# Patient Record
Sex: FEMALE | Race: BLACK OR AFRICAN AMERICAN | Employment: FULL TIME | URBAN - METROPOLITAN AREA
[De-identification: names, ages, dates, MRNs, and addresses within clinical notes are randomized per-mention and may not be internally consistent; named-entity substitution may affect disease eponyms.]

---

## 2017-02-15 ENCOUNTER — HOSPITAL ENCOUNTER (OUTPATIENT)
Age: 32
Discharge: HOME | End: 2017-02-15
Attending: NURSE PRACTITIONER
Payer: COMMERCIAL

## 2017-02-15 DIAGNOSIS — N97.9 FEMALE INFERTILITY: ICD-10-CM

## 2017-02-15 DIAGNOSIS — E22.1 HYPERPROLACTINEMIA (HCC): ICD-10-CM

## 2017-02-15 PROCEDURE — 74011250636 HC RX REV CODE- 250/636: Performed by: NURSE PRACTITIONER

## 2017-02-15 PROCEDURE — A9585 GADOBUTROL INJECTION: HCPCS | Performed by: NURSE PRACTITIONER

## 2017-02-15 PROCEDURE — 70553 MRI BRAIN STEM W/O & W/DYE: CPT

## 2017-02-15 RX ADMIN — GADOBUTROL 10 ML: 604.72 INJECTION INTRAVENOUS at 17:00

## 2018-02-27 PROBLEM — Z37.9 NORMAL LABOR: Status: ACTIVE | Noted: 2018-02-27

## 2018-10-09 ENCOUNTER — TELEPHONE (OUTPATIENT)
Dept: SURGERY | Age: 33
End: 2018-10-09

## 2018-10-09 NOTE — TELEPHONE ENCOUNTER
Contacted patient and verified identity using name and date of birth (2- identifiers). Called to see if pt received e-mail to watch video about surgical procedures. Pt stated she received e-mail and has already watched the video.

## 2018-10-12 ENCOUNTER — OFFICE VISIT (OUTPATIENT)
Dept: SURGERY | Age: 33
End: 2018-10-12

## 2018-10-12 VITALS
TEMPERATURE: 98.5 F | HEART RATE: 64 BPM | SYSTOLIC BLOOD PRESSURE: 138 MMHG | DIASTOLIC BLOOD PRESSURE: 88 MMHG | BODY MASS INDEX: 45.99 KG/M2 | HEIGHT: 67 IN | WEIGHT: 293 LBS | RESPIRATION RATE: 16 BRPM

## 2018-10-12 DIAGNOSIS — E66.01 MORBID OBESITY (HCC): Primary | ICD-10-CM

## 2018-10-12 DIAGNOSIS — E55.9 HYPOVITAMINOSIS D: ICD-10-CM

## 2018-10-12 RX ORDER — PHENTERMINE HYDROCHLORIDE 15 MG/1
15 CAPSULE ORAL
COMMUNITY
End: 2019-05-06

## 2018-10-12 NOTE — PROGRESS NOTES
Initial Consultation for Bariatric Surgery Template (Gastric Bypass)    Faviola Hong is a 35 y.o. female who comes into the office today for initial consultation for the surgical options for the treatment of morbid obesity. The patient initially identified obesity at the age of 12 and at age 25 weighed 210lbs. She has tried a variety of unsupervised weight-loss attempts including self imposed and Weight Watchers, but has yet to meet with lasting success. Maximum weight lost on a diet is about 60 lbs, but that the weight loss always seems to return. Today, the patient is  Height: 5' 7\" (170.2 cm) tall, Weight: 139.3 kg (307 lb) lbs for a Body mass index is 48.08 kg/(m^2). It is due to the patient's severe obesity, which is further complicated by hypovit D  that the patient is now seeking out bariatric surgery, specifically, gastric bypass. Past Medical History:   Diagnosis Date    Asthma     Hyperprolactinemia ([facility] [address]     Hypothyroidism        No past surgical history on file. Current Outpatient Prescriptions   Medication Sig Dispense Refill    phentermine (ADIPEX_P) 15 mg capsule Take 15 mg by mouth every morning.  CHOLECALCIFEROL, VITAMIN D3, (VITAMIN D3 PO) Take  by mouth.  levothyroxine (SYNTHROID) 75 mcg tablet Take 75 mcg by mouth Daily (before breakfast).  PNV RE.95/YPEVDHD FUM/FOLIC AC (PRENATAL PO) Take  by mouth.          No Known Allergies    Social History   Substance Use Topics    Smoking status: Never Smoker    Smokeless tobacco: Never Used    Alcohol use Yes      Comment: occ       Family History   Problem Relation Age of Onset    Hypertension Mother     Sleep Apnea Mother     Hypertension Father        Family Status   Relation Status    Mother [de-identified]    Father Alive       Review of Systems:  Positive in BOLD    CONST: Fever, weight loss, fatigue or chills  GI: Nausea, vomiting, abdominal pain, change in bowel habits, hematochezia, melena, and GERD   INTEG: Dermatitis, abnormal moles  HEENT: Recent changes in vision, vertigo, epistaxis, dysphagia and hoarseness  CV: Chest pain, palpitations, HTN, edema and varicosities  RESP: Cough, shortness of breath, wheezing, hemoptysis, snoring and reactive airway disease  : Hematuria, dysuria, frequency, urgency, nocturia and stress urinary incontinence   MS: Weakness, joint pain - knees and arthritis  ENDO: Diabetes, thyroid disease, polyuria, polydipsia, polyphagia, poor wound healing, heat intolerance, cold intolerance  LYMPH/HEME: Anemia, bruising and history of blood transfusions  NEURO: Dizziness, headache, fainting, seizures and stroke  PSYCH: Anxiety and depression      Physical Exam    Visit Vitals    /88 (BP 1 Location: Right arm, BP Patient Position: Sitting)    Pulse 64    Temp 98.5 °F (36.9 °C) (Oral)    Resp 16    Ht 5' 7\" (1.702 m)    Wt 139.3 kg (307 lb)    LMP 04/12/2018    BMI 48.08 kg/m2       Pre op weight: 307  EBW: 167  Wt loss to date: 0       General: 35 y.o.) female in no acute distress. Morbidly obese in hips, thighs, buttocks - gynecoid pattern  HEENT: Normocephalic, atraumatic, Pupils equal and reactive, nasopharynx clear, oropharynx clear and moist without lesions  NECK: Supple, no lymphadenopathy, thyromegaly, carotid bruits or jugular venous distension. trachea midline  RESP: Clear to auscultation bilaterally, no wheezes, rhonchi, or rales, normal respiratory excursion  CV: Regular rate and rhythm, no murmurs, rubs or gallops. 3+/4 pulses in bilateral dorsalis pedis and posterior tibialis. No distal edema or varicosities. ABD: Soft, nontender, nondistended, normoactive bowel sounds, no hernias, no hepatosplenomegaly, easily palpable costal margins, gynecoid distribution  Extremities: Warm, well perfused, no tenderness or swelling, normal gait/station  Neuro: Sensation and strength grossly intact and symmetrical  Psych: Alert and oriented to person, place, and time.     Impression:    Jevon Ammons Pearl Luis is a 35 y.o. female who is suffering from morbid obesity with a BMI of 48  and comorbidities including no significant previous medical problems  who would benefit from bariatric surgery. We have had an extensive discussion with regard to the risks, benefits and likely outcomes of the operation. We've discussed the restrictive and malabsorptive nature of the gastric bypass and compared and contrasted with the sleeve gastrectomy. The patient understands the likelihood of losing approximately 80% of their excess weight in 12 to 18 months. She also understands the risks including but not limited to bleeding, infection, need for reoperation, ulcers, leaks and strictures, bowel obstruction secondary to adhesions and internal hernias, DVT, PE, heart attack, stroke, and death. Patient also understands risks of inadequate weight loss, excess weight loss, vitamin insufficiency, protein malnutrition, excess skin, and loss of hair. We have reviewed the components of a successful postoperative course including requirement for a high protein, low carbohydrate diet, 60 oz a day of zero calorie liquids, daily vitamin supplementation, daily exercise, regular follow-up, and participation in support groups. At this time we will enroll the patient in our bariatric program, undertake routine laboratory evaluation, chest X-ray, EKG, possible UGI and evaluation by  nutritionist as well as psychologist and pending their satisfactory completion of the preop evaluation, plan to perform a gastric bypass.

## 2018-10-12 NOTE — PROGRESS NOTES
Daquan Lim is a 35 y.o. female Who presents today seeking education on weight loss options. Body mass index is 48.08 kg/(m^2). 1. Have you been to the ER, urgent care clinic since your last visit? Hospitalized since your last visit? No    2. Have you seen or consulted any other health care providers outside of the [address] [place] [address] since your last visit? Include any pap smears or colon screening.  No

## 2018-10-16 LAB — UREA BREATH TEST QL: NEGATIVE

## 2018-10-25 ENCOUNTER — HOSPITAL ENCOUNTER (OUTPATIENT)
Dept: BARIATRICS/WEIGHT MGMT | Age: 33
Discharge: HOME | End: 2018-10-25

## 2018-10-26 ENCOUNTER — DOCUMENTATION ONLY (OUTPATIENT)
Dept: BARIATRICS/WEIGHT MGMT | Age: 33
End: 2018-10-26

## 2018-10-26 NOTE — PROGRESS NOTES
[address] [place] [address] [facility], [address]    Patient's Name: Isaac Zamora   Age: 35 y.o. YOB: 1985   Sex: female    Date:   10/26/2018    Insurance:            Session: 1 of 6  Revision:   Surgeon:  Dr. Carl Underwood    Height: 5 f 7 Weight:    305      Lbs. BMI: 47.9   Pounds Lost since last month: 2               Pounds Gained since last month: 0    Starting Weight: 307   Previous Months Weight: 307  Overall Pounds Lost: 2 Overall Pounds Gained: 0      Do you smoke? None    Alcohol intake:  Number of drinks at a time:  2  Number of times a week: 1 x a week    Class Guidelines    Guidelines are reviewed with patient at the start of every class. 1. Patient understands that weight loss trial classes must be consecutive. Patient understands if they miss a class, it is their responsibility to contact me to reschedule class. I will reach out to patient after their first no show. 2.  Patient understands the expectations that weight maintenance/weight loss is expected during the classes. Failure to demonstrate changes may result in one extra month of weight loss trial, followed by going back to see the surgeon. 3. Patient is also instructed to be doing their labs, blood work, psych visit, support group and any other test that the surgeon has used while they are working on their weight loss trial.  4. Patient is instructed to bring their education binder to all classes. Other Pertinent Information:     Changes Made Since Last Class: Lower her carbohydrate intake    Eating Habits and Behaviors      Today we reviewed key diet principles. We talked about patient following a low calorie/low carbohydrate diet while they are in weight loss trials. To achieve this, patient is encouraged to avoid liquid calories, including alcohol, soda, sweet tea, and fruit juices.    Patient can cut carbohydrates by trying to stick to meat and vegetables. Patient can also eat eggs, cheese, and good fat, while trying to eliminate starches, such as pasta, rice, crackers, chips, popcorn. I also gave a power point that included 21 Ways to Stay on Track with a Healthy Lifestyle. Some of the food-related suggestions included drinking plenty of water or calorie-free beverages prior to their meal.  Patient is encouraged to to fill up on protein first, which is the ultimate fill-me up food. We talked about the importance of eating breakfast and the effects that can happen if one skips meals, which includes eating larger portions, snacking more, and decreasing their metabolism. With the suggestions in the power point, patient will be able to decrease their calories and carbohydrate intake. Patient's current diet habits include: 3 meals per day. Patient is doing a slice of quiche for breakfast.  Lunch is a piece of protein, veggies, and very little carbohydrates. Patient is doing leftovers for dinner but states she is doing very little. She states she is trying to be more mindful of her snack choices and eating less carbohydrates. She is drinking 64 ounces of water per day. Physical Activity/Exercise    Comments: We talked about the importance of establishing a work out routine. Patient is currently doing walking for 30 minutes daily for activity. Behavior Modification       Comments:   Some of the behavior tips that were included in the power point, include being choosy about night time snacking. Patient was encouraged to make the TV a no eating zone and not eat after 7 pm.  Patient is also encouraged to keep a food journal.        One of the other things we talked about during class is whether or not patient has a support system. Patient indicated that their family is supportive of the surgery and they have some that will be there for them after surgery.   I also reminded all patients to make their psychologist appointment and attend at least 1 support group.       Sonnie Mortimer, Luite Norberto [address] RD  10/26/2018

## 2018-10-27 ENCOUNTER — HOSPITAL ENCOUNTER (OUTPATIENT)
Dept: LAB | Age: 33
Discharge: HOME | End: 2018-10-27
Payer: COMMERCIAL

## 2018-10-27 DIAGNOSIS — E55.9 HYPOVITAMINOSIS D: ICD-10-CM

## 2018-10-27 DIAGNOSIS — E66.01 MORBID OBESITY (HCC): ICD-10-CM

## 2018-10-27 LAB
25(OH)D3 SERPL-MCNC: 19.9 NG/ML (ref 30–100)
ALBUMIN SERPL-MCNC: 3.8 G/DL (ref 3.4–5)
ANION GAP SERPL CALC-SCNC: 6 MMOL/L (ref 3–18)
BASOPHILS # BLD: 0 K/UL (ref 0–0.1)
BASOPHILS NFR BLD: 0 % (ref 0–2)
BUN SERPL-MCNC: 13 MG/DL (ref 7–18)
BUN/CREAT SERPL: 13 (ref 12–20)
CALCIUM SERPL-MCNC: 8.9 MG/DL (ref 8.5–10.1)
CHLORIDE SERPL-SCNC: 107 MMOL/L (ref 100–108)
CO2 SERPL-SCNC: 27 MMOL/L (ref 21–32)
CREAT SERPL-MCNC: 1.02 MG/DL (ref 0.6–1.3)
DIFFERENTIAL METHOD BLD: ABNORMAL
EOSINOPHIL # BLD: 0.1 K/UL (ref 0–0.4)
EOSINOPHIL NFR BLD: 1 % (ref 0–5)
ERYTHROCYTE [DISTWIDTH] IN BLOOD BY AUTOMATED COUNT: 13.5 % (ref 11.6–14.5)
EST. AVERAGE GLUCOSE BLD GHB EST-MCNC: 111 MG/DL
FOLATE SERPL-MCNC: 18.6 NG/ML (ref 3.1–17.5)
GLUCOSE SERPL-MCNC: 74 MG/DL (ref 74–99)
HBA1C MFR BLD: 5.5 % (ref 4.2–5.6)
HCT VFR BLD AUTO: 41 % (ref 35–45)
HGB BLD-MCNC: 13.3 G/DL (ref 12–16)
IRON SERPL-MCNC: 58 UG/DL (ref 50–175)
LYMPHOCYTES # BLD: 2 K/UL (ref 0.9–3.6)
LYMPHOCYTES NFR BLD: 44 % (ref 21–52)
MCH RBC QN AUTO: 30.3 PG (ref 24–34)
MCHC RBC AUTO-ENTMCNC: 32.4 G/DL (ref 31–37)
MCV RBC AUTO: 93.4 FL (ref 74–97)
MONOCYTES # BLD: 0.6 K/UL (ref 0.05–1.2)
MONOCYTES NFR BLD: 12 % (ref 3–10)
NEUTS SEG # BLD: 2 K/UL (ref 1.8–8)
NEUTS SEG NFR BLD: 43 % (ref 40–73)
PLATELET # BLD AUTO: 270 K/UL (ref 135–420)
PMV BLD AUTO: 10.3 FL (ref 9.2–11.8)
POTASSIUM SERPL-SCNC: 4 MMOL/L (ref 3.5–5.5)
RBC # BLD AUTO: 4.39 M/UL (ref 4.2–5.3)
SODIUM SERPL-SCNC: 140 MMOL/L (ref 136–145)
TSH SERPL DL<=0.05 MIU/L-ACNC: 1.49 UIU/ML (ref 0.36–3.74)
VIT B12 SERPL-MCNC: 302 PG/ML (ref 211–911)
WBC # BLD AUTO: 4.7 K/UL (ref 4.6–13.2)

## 2018-10-27 PROCEDURE — 85025 COMPLETE CBC W/AUTO DIFF WBC: CPT | Performed by: SURGERY

## 2018-10-27 PROCEDURE — 83036 HEMOGLOBIN GLYCOSYLATED A1C: CPT | Performed by: SURGERY

## 2018-10-27 PROCEDURE — 84443 ASSAY THYROID STIM HORMONE: CPT | Performed by: SURGERY

## 2018-10-27 PROCEDURE — 82607 VITAMIN B-12: CPT | Performed by: SURGERY

## 2018-10-27 PROCEDURE — 82306 VITAMIN D 25 HYDROXY: CPT | Performed by: SURGERY

## 2018-10-27 PROCEDURE — 82040 ASSAY OF SERUM ALBUMIN: CPT | Performed by: SURGERY

## 2018-10-27 PROCEDURE — 36415 COLL VENOUS BLD VENIPUNCTURE: CPT | Performed by: SURGERY

## 2018-10-27 PROCEDURE — 80048 BASIC METABOLIC PNL TOTAL CA: CPT | Performed by: SURGERY

## 2018-10-27 PROCEDURE — 83540 ASSAY OF IRON: CPT | Performed by: SURGERY

## 2018-10-27 PROCEDURE — 84425 ASSAY OF VITAMIN B-1: CPT | Performed by: SURGERY

## 2018-11-01 LAB — VIT B1 BLD-SCNC: 87.8 NMOL/L (ref 66.5–200)

## 2018-11-01 NOTE — PROGRESS NOTES
11/1/18:  Patient states that her PCP recently prescribed 2000 IU of vitamin d3 per day. I have talked with patient that I would like her to start taking 5000 IU per day. Patient was also instructed to start MVI due to her folate levels being low.  
 
Alisia Nash MS RD

## 2018-11-29 ENCOUNTER — HOSPITAL ENCOUNTER (OUTPATIENT)
Dept: BARIATRICS/WEIGHT MGMT | Age: 33
Discharge: HOME | End: 2018-11-29

## 2018-11-30 ENCOUNTER — DOCUMENTATION ONLY (OUTPATIENT)
Dept: BARIATRICS/WEIGHT MGMT | Age: 33
End: 2018-11-30

## 2018-11-30 NOTE — PROGRESS NOTES
[address] [place] [address] [facility], [address]    Patient's Name: Constance Suero   Age: 35 y.o. YOB: 1985   Sex: female    Date:   11/30/2018    Insurance:            Session: 2 of 6  Revision:   Surgeon:  Dr. Manuel Enrique    Height: 5 f 7 Weight:    303      Lbs. BMI: 47.5   Pounds Lost since last month: 2               Pounds Gained since last month: 0    Starting Weight: 307   Previous Months Weight: 305  Overall Pounds Lost: 4 Overall Pounds Gained: 0      Do you smoke? None    Alcohol intake:  Number of drinks at a time:  1  Number of times a week: 1    Class Guidelines    Guidelines are reviewed with patient at the start of every class. 1. Patient understands that weight loss trial classes must be consecutive. Patient understands if they miss a class, it is their responsibility to contact me to reschedule class. I will reach out to patient after their first no show. 2.  Patient understands the expectations that weight maintenance/weight loss is expected during the classes. Failure to demonstrate changes may result in one extra month of weight loss trial, followed by going back to see the surgeon. 3. Patient is also instructed to be doing their labs, blood work, psych visit, support group and any other test that the surgeon has used while they are working on their weight loss trial.    Other Pertinent Information:     Changes Made Since Last Class: Drinking less wine and having less sweets. Dietary Instruction    During today's class we continued to focus on the key diet principles. Patient was instructed to follow a low carbohydrate diet, focusing on meat and vegetables. Patient was instructed to stop liquid calories and aim for 64 ounces of water per day. In class, I also gave patient a power point on surviving the holidays.   Some of the tips included survival tips for parties, including bringing their own low carbohydrate dish to a potluck dinner and surveying the buffet line before they start filling up their plate. Patient was given cooking alternatives, including using Splenda for sugar, substituting applesauce for oil in recipes, and using low fat plain yogurt instead of sour cream in dips. Patient was also encouraged to be mindful of calories in alcohol. Patient's diet habits include: 3 meals per day. Patient is eating eggs and cheese or oatmeal for breakfast.  Lunch is meat and vegetables. Dinner is meat and vegetables. She states she had cut out the snacking between meals. She is eating out 1-3 x a week, which may be chinese or kandi. Physical Activity/Exercise    Patient is currently not doing anything for activity. Today's power point on surviving the holidays also included tips on exercising. This included being creative during the holiday, walking stairs, mall walking, getting resistance bands. Patient was encouraged not to be afraid to excuse themselves from the table to go for a walk after they eat. Comments: She states she has a baby and it's difficult. We talked about increasing some walking. Behavior Modification    Reinforced behavior changes to make. Patient was encouraged to keep their emotions in check. Try to HALT and focus on whether they are eating out of hunger or if they are eating out of emotions. Other eating behaviors included surveying the buffet line before starting to fill up their plate. Patient was given a check off list and encouraged to monitor some of their eating behaviors, such as eating slowly, chewing their food thoroughly, and taking 20-30 minutes to eat a meal.    Goals that patient set for next month include:  1. Exercise more. 2. Less take out and more meal prep.   3. Drink more water      Nicholas Choudhary Norberto 87 RD  11/30/2018

## 2018-12-08 ENCOUNTER — HOSPITAL ENCOUNTER (OUTPATIENT)
Dept: LAB | Age: 33
Discharge: HOME | End: 2018-12-08
Payer: COMMERCIAL

## 2018-12-08 DIAGNOSIS — E66.01 MORBID OBESITY (HCC): ICD-10-CM

## 2018-12-08 DIAGNOSIS — E55.9 HYPOVITAMINOSIS D: ICD-10-CM

## 2018-12-08 LAB
ATRIAL RATE: 63 BPM
CALCULATED P AXIS, ECG09: 8 DEGREES
CALCULATED R AXIS, ECG10: 41 DEGREES
CALCULATED T AXIS, ECG11: 25 DEGREES
DIAGNOSIS, 93000: NORMAL
P-R INTERVAL, ECG05: 158 MS
Q-T INTERVAL, ECG07: 428 MS
QRS DURATION, ECG06: 84 MS
QTC CALCULATION (BEZET), ECG08: 437 MS
VENTRICULAR RATE, ECG03: 63 BPM

## 2018-12-08 PROCEDURE — 93005 ELECTROCARDIOGRAM TRACING: CPT

## 2018-12-27 ENCOUNTER — HOSPITAL ENCOUNTER (OUTPATIENT)
Dept: BARIATRICS/WEIGHT MGMT | Age: 33
Discharge: HOME | End: 2018-12-27

## 2018-12-28 ENCOUNTER — DOCUMENTATION ONLY (OUTPATIENT)
Dept: BARIATRICS/WEIGHT MGMT | Age: 33
End: 2018-12-28

## 2018-12-28 NOTE — PROGRESS NOTES
[address] [place] [address] [facility], [address]    Patient's Name: Alfonzo Triana   Age: 35 y.o. YOB: 1985   Sex: female    Date:   12/28/2018    Insurance:              Session: 3 of  6  Revision:     Surgeon:  Dr. Kwame Majano    Height: 5 f 7   Weight:    299      Lbs. BMI: 46.9   Pounds Lost since last month: 4                 Pounds Gained since last month: 0      Starting Weight: 307     Previous Months Weight: 303  Overall Pounds Lost: 8   Overall Pounds Gained: 0    Do you smoke? None    Alcohol intake:  Number of drinks at a time:  0-1  Number of times a week: 1    Class Guidelines    Guidelines are reviewed with patient at the start of every class. 1. Patient understands that weight loss trial classes must be consecutive. Patient understands if they miss a class, it is their responsibility to contact me to reschedule class. I will reach out to patient after their first no show. 2.  Patient understands the expectations that weight maintenance/weight loss is expected during the classes. Failure to demonstrate changes may result in one extra month of weight loss trial, followed by going back to see the surgeon. Patient understands that they CAN NOT gain any weight during the weight loss trial.  Gaining weight will result in extra classes. 3. Patient is also instructed to be doing their labs, blood work, psych visit, support group and any other test that the surgeon has used while they are working on their weight loss trial.  4.  Patient was instructed to bring their blue binder to every class and appointment. Other Pertinent Information:     Changes Made Since Last Class: Lost 2 more pounds, increased water intake, added more vegetables to diet. Eating Habits and Behaviors    Today in class, we reviewed the key diet principles. Specifically, patient was instructed to watch their carbohydrate intake.   We talked about foods that have carbohydrates in them and alternatives in place of this. Patient was encouraged to start cutting out bread, rice, pasta, and other starches. Patient is encouraged to work towards 64 ounces of fluid per day, avoiding soda, sweet tea, and fruit juices. I also gave a presentation at today's class on Eat Out Options. We looked at fast food traps and dining out strategies to stay in control. Patient was also given ideas from various restaurants that would be a healthier option. Patient's current diet habits include: 3 meals per day. Patient is doing eggs or oatmeal for breakfast.  Lunch is protein and vegetables. Dinner is protein and vegetables, on her good days. She states she is sometimes snacking on celery, carrots, or cookies. She states she is drinking very little between water and Crystal Light and is encouraged to keep pushing the fluid to get up to 64 ounces. Physical Activity/Exercise    Comments: We talked about exercise. Patient was given reasons of why exercise is so important and how that can help with their long-term success. I have encouraged patient to get a support system to help with the activity. Currently for activity, patient is doing [address],  [address] for 60 minutes 1 x a week. Behavior Modification       Comments:  I have also talked to patient about some behavior changes including taking 20-30 minutes to eat a meal.  Patient is encouraged to get a timer and use smaller utensils to help them stretch their meal out. Patient is also encouraged to get into a routine of not eating after 7 pm and make the TV a no eating zone. Goals that patient wants to work on includes:  1. Increase water intake more  2. Reduce caffeine intake   3. Get back to a keto diet.       Sonnie Mortimer, Luite Norberto [address] RD  12/28/2018

## 2019-01-24 ENCOUNTER — HOSPITAL ENCOUNTER (OUTPATIENT)
Dept: BARIATRICS/WEIGHT MGMT | Age: 34
Discharge: HOME | End: 2019-01-24

## 2019-01-25 ENCOUNTER — DOCUMENTATION ONLY (OUTPATIENT)
Dept: BARIATRICS/WEIGHT MGMT | Age: 34
End: 2019-01-25

## 2019-01-25 NOTE — PROGRESS NOTES
[address] [place] [address] [facility], [address]    Patient's Name: Christian Kemp   Age: 35 y.o. YOB: 1985   Sex: female    Date:   1/25/2019    Insurance:             Session: 4 of 6  Revision:     Surgeon:  Dr. Paty Bullard    Height: 5 f 7   Weight:    297      Lbs. BMI: 46.6   Pounds Lost since last month: 2                 Pounds Gained since last month: 0    Starting Weight: 307     Previous Months Weight: 299  Overall Pounds Lost: 10   Overall Pounds Gained: 0    Do you smoke? None    Alcohol intake:  Number of drinks at a time:  1  Number of times a week: 4    Class Guidelines    Guidelines are reviewed with patient at the start of every class. 1. Patient understands that weight loss trial classes must be consecutive. Patient understands if they miss a class, it is their responsibility to contact me to reschedule class. I will reach out to patient after their first no show. 2.  Patient understands the expectations that weight maintenance/weight loss is expected during the classes. Failure to demonstrate changes may result in one extra month of weight loss trial, followed by going back to see the surgeon. 3. Patient is also instructed to be doing their labs, blood work, psych visit, support group and any other test that the surgeon has used while they are working on their weight loss trial.    Other Pertinent Information:     Changes Made Since Last Class: Beginning a more consistent schedule with her baby, lower amounts of coffee, lower eating out. Eating Habits and Behaviors      Today we reviewed key diet principles. We talked about snack ideas that would focus more on protein. We also talked about the benefits of filling up on protein first and keeping the daily carbohydrate intake to less than 100 grams per day.   Patient was instructed to increase fluid intake to 64 ounces per day and stop all carbonation, caffeine, and sugary drinks. During class, we talked about the importance of getting on a routine of eating 3 meals a day, eating within one hour of waking up, and not going longer than 4 hours without fueling the body again. I also talked with patient about some meal ideas. Patient's current diet habits include: 3 meals per day. Patient is eating eggs, meat or pancake and hash browns for breakfast.  Lunch is protein and vegetables. Dinner is meal prep items. Patient states she is eating out 1-3 x a week. She is drinking 32 ounces of water and 10 ounces of caffeine,which she has already started weaning from. Physical Activity/Exercise    Comments:     Currently for exercise, patient is doing [address],  [address] for 1 hour a week. We talked about activities for patient to do, including walking, swimming, or chair exercises. Behavior Modification       Comments:   During class, I reviewed a power point with patients called, \"Assessing Your Readiness to Change. \"  During this power point, patient was asked to self-evaluate themselves. At the end, we tallied the scores to determine how ready they are to make changes for the surgery. For the New Year's, I had patient set New Year's resolutions, including a food-related goal, exercise-related goal, and behavior goal.  Patient was encouraged to track the goals on a daily basis using the check off list I provided. Goals should be SMART, specific, measurable, attainable, realistic, and time-orientated. Patient's Goals are:  1. Behavior-Related Goal: Not eating in the car or upstairs. 2. Food-related goal: No more [employer] fries  3. Exercise-related goal: 30 minutes of weights, 2 x a week.       Inocencia Portillo, MS RD  1/25/2019

## 2019-02-11 ENCOUNTER — OFFICE VISIT (OUTPATIENT)
Dept: SURGERY | Age: 34
End: 2019-02-11

## 2019-02-11 VITALS
HEART RATE: 65 BPM | TEMPERATURE: 97.9 F | BODY MASS INDEX: 45.99 KG/M2 | HEIGHT: 67 IN | WEIGHT: 293 LBS | RESPIRATION RATE: 18 BRPM | SYSTOLIC BLOOD PRESSURE: 119 MMHG | DIASTOLIC BLOOD PRESSURE: 81 MMHG

## 2019-02-11 DIAGNOSIS — E66.01 MORBID OBESITY (HCC): Primary | ICD-10-CM

## 2019-02-11 DIAGNOSIS — E55.9 HYPOVITAMINOSIS D: ICD-10-CM

## 2019-02-11 DIAGNOSIS — J45.909 UNCOMPLICATED ASTHMA, UNSPECIFIED ASTHMA SEVERITY, UNSPECIFIED WHETHER PERSISTENT: ICD-10-CM

## 2019-02-11 NOTE — PROGRESS NOTES
Chief Complaint   Patient presents with    Follow-up     mid trial LGBP. Pt ID confirmed    Weight Loss Metrics 2/11/2019 2/11/2019 10/12/2018 10/12/2018 2/27/2018   Pre op / Initial Wt 299.4 - 307 - -   Today's Wt - 299 lb 6.4 oz - 307 lb 306 lb   BMI - 46.89 kg/m2 - 48.08 kg/m2 47.93 kg/m2   Ideal Body Wt 140 - 140 - -   Excess Body Wt 159.4 - 167 - -   Goal Wt 172 - 174 - -   Wt loss to date 0 - 0 - -   % Wt Loss 0 - 0 - -   80% .52 - 133.6 - -       Body mass index is 46.89 kg/m².

## 2019-02-11 NOTE — PROGRESS NOTES
Bariatric Preoperative Progress Note    Subjective:     Uvaldo Rodriguez is a 35 y.o. female who presents today for followup of their candidacy for bariatric surgery. Since last seen, Uvaldo Rodriguez has been working through bariatric program towards laparoscopic gastric bypass. Past Medical History:   Diagnosis Date    Asthma     Hyperprolactinemia ([address]     Hypothyroidism        History reviewed. No pertinent surgical history. Current Outpatient Medications   Medication Sig Dispense Refill    phentermine (ADIPEX_P) 15 mg capsule Take 15 mg by mouth every morning.  CHOLECALCIFEROL, VITAMIN D3, (VITAMIN D3 PO) Take 5,000 Units by mouth. No Known Allergies    Review of Systems:  Positive in BOLD     CONST: Fever, weight loss, fatigue or chills  GI: Nausea, vomiting, abdominal pain, change in bowel habits, hematochezia, melena, and GERD   INTEG: Dermatitis, abnormal moles  HEENT: Recent changes in vision, vertigo, epistaxis, dysphagia and hoarseness  CV: Chest pain, palpitations, HTN, edema and varicosities  RESP: Cough, shortness of breath, wheezing, hemoptysis, snoring and reactive airway disease  : Hematuria, dysuria, frequency, urgency, nocturia and stress urinary incontinence   MS: Weakness, joint pain - knees and arthritis  ENDO: Diabetes, thyroid disease, polyuria, polydipsia, polyphagia, poor wound healing, heat intolerance, cold intolerance  LYMPH/HEME: Anemia, bruising and history of blood transfusions  NEURO: Dizziness, headache, fainting, seizures and stroke  PSYCH: Anxiety and depression        Remainder of ROS as per HPI. Objective:     Physical Exam:  Visit Vitals  /81 (BP 1 Location: Right arm, BP Patient Position: Sitting)   Pulse 65   Temp 97.9 °F (36.6 °C) (Oral)   Resp 18   Ht 5' 7\" (1.702 m)   Wt 135.8 kg (299 lb 6.4 oz)   BMI 46.89 kg/m²         General: AAOX3, pleasant and cooperative to exam. Appropriately groomed. NAD. Non-toxic in appearance.  Appears stated age. HENT: NC/AT. PERRLA. Extraocular motions are intact. Sclera anicteric, Conjunctiva Clear. Nares clear. Oropharynx pink, moist without exudate or erythema. Uvula Midline. Neck:  Supple, trachea is midline. No JVD, Lymphadenopathy. No bruits. Chest: Good equal bilateral expansion  Lungs: Clear to auscultation bilaterally without e/o crackles, wheezes or rhales. Heart: RRR, S1 and S2 noted. No c/r/m/g/vpmi. Abdomen: obese, soft and non-tender without distension. Good bowel sounds. No vis/palp masses or pulsations. No organo-splenomegaly. No hernias to my exam. No e/o acute abdomen or peritoneal signs. Pelvis: Stable. :  Deferred  Rectal: Deferred  Extremities: Positive pulses in all 4 extremities. Baseline range of motion in all 4 extremities. Strength, sensation and reflexes intact, appropriate and equal in b/l upper and lower extremities. No C/C/E  Neuro: CN II-XII grossly intact without focal deficit. Ambulatory. Skin: Clean, warm and dry. Studies to date:    Labs: significant for hypovitaminosis D which is currently being treated. EKG: Normal sinus rhythm, Normal ECG, No previous ECGs available. Confirmed by Kelly Bose MD, [place] Dolly ([id number]) on 12/8/2018 11:50:52 AM      Nutritional evaluation: 4 of 6 weight loss trial visits completed with Celena Brown on 25 January 2019    Psychiatric evaluation: Cleared by Dr. Savannah Cagle    Support Group: Pending    Additional evaluations: Primary care pending        Assessment:   Jean Goldberg is a 35 y.o. female who is progressing through the bariatric preoperative evaluation. At this time, they are not yet an appropriate candidate for weight loss surgery. Ms. Monzon President has a reminder for a \"due or due soon\" health maintenance. I have asked that she contact her primary care provider for follow-up on this health maintenance.       Plan:   -complete remainder of preop evaluation including those items listed above  -Patient voices understanding that weight gain during weight loss trial may result in cancellation of weight loss surgery.   -Follow up once has completed entirety of weight loss workup to determine next steps.       Kendall Obrien MS, PA-C

## 2019-02-27 ENCOUNTER — HOSPITAL ENCOUNTER (OUTPATIENT)
Dept: BARIATRICS/WEIGHT MGMT | Age: 34
Discharge: HOME | End: 2019-02-27

## 2019-02-27 ENCOUNTER — DOCUMENTATION ONLY (OUTPATIENT)
Dept: BARIATRICS/WEIGHT MGMT | Age: 34
End: 2019-02-27

## 2019-02-27 NOTE — PROGRESS NOTES
[address] [place] [address] [facility], [address]    Patient's Name: Ladi Gibson   Age: 35 y.o. YOB: 1985   Sex: female    Date:   2/27/2019    Insurance:            Session: 5 of 6  Revision:   Surgeon:  Dr. Brandy Collet    Height: 5 f 7 Weight:    296      Lbs. BMI: 46.5   Pounds Lost since last month: 1               Pounds Gained since last month: 0    Starting Weight: 307   Previous Months Weight: 297  Overall Pounds Lost: 11 Overall Pounds Gained: 0      Do you smoke? None    Alcohol intake:  Number of drinks at a time:  1  Number of times a week: 1    Class Guidelines    Guidelines are reviewed with patient at the start of every class. 1. Patient understands that weight loss trial classes must be consecutive. Patient understands if they miss a class, it is their responsibility to contact me to reschedule class. I will reach out to patient after their first no show. 2.  Patient understands the expectations that weight maintenance/weight loss is expected during the classes. Failure to demonstrate changes may result in one extra month of weight loss trial, followed by going back to see the surgeon. 3. Patient is also instructed to be doing their labs, blood work, psych visit, support group and any other test that the surgeon has used while they are working on their weight loss trial.   Patient understands that they CAN NOT gain any weight during the weight loss trial.  Gaining weight will result in extra classes    Other Pertinent Information:     Changes Made Since Last Class:  Patient states she used the check off list that I gave her. She states she minimized eating out. She states she also is not eating upstairs, but rather eating at a table. Eating Habits and Behaviors      Today we started off class talking about the Key Diet Principles. Patient was encouraged to start drinking 64 ounces of fluid per day. Patient was encouraged to start cutting out soda, caffeine, carbonation, sweet tea, fruit juice, and fruit smoothies. Patient is currently drinking 56 ounces of fluid, which consists of 3 bottles of water and 1 cup of coffee. Patient was also instructed to fill up on meat, fish, vegetables, eggs, cheese, and some fruit. We also talked about protein drinks and patient was encouraged to start trying these, using them either for a meal replacement or a substitute for a current meal, which may be higher in carbohydrates. We talked about ways to lower carbohydrates and start trying substitutes, such as zucchini noodles and cauliflower rice. Patient's current diet habits include: Patient states she gets up around 5:30. She state she will eat breakfast around 8 am. Patient states she will do an omelette egg cup, which she can grab and go. She states sometimes she will do a pancake protein cup. Lunch:  Patient states she has been picking a protein and doing either fish or baked chicken with salad. She states she will do a salad pack from [email or web address]. Dinner:  Patient states she will do some type of green vegetables with a protein source. Patient states she is not eating a lot of carbohydrates. She is trying to cut these out. She states she may snack on celery with hummus. She states she eats plain [place] yogurt with [employer]ie onion soup. Physical Activity/Exercise    Comments:     Currently for exercise, patient is doing a dance class and downloaded a [employer]. She states she started this 2 days ago. We talked about activities for patient to do, including walking, swimming, or chair exercises. I also talked with patient about doing some strength training, which helps the metabolism, as well. Goals have been set. Behavior Modification       Comments:   I also gave a power point on Behavior Changes and Weight Loss. Some of the suggestions in the power point included food journaling.   Patient was also given some strategies to follow, such as cooking just enough for the meal and not putting serving bowls on the table. Patient was also encouraged to restrict where they are eating to 1-2 locations to avoid mindless eating throughout the day. Patient was given a check off list and encouraged to set 3 goals for the month. One goal that patient has set for next month is:  1. Increase her activity routine. 2. Protein-based snacks between meals. 3. Patient is going to try different protein drinks.       Nicholas Dinero Norberto 87 RD  2/27/2019

## 2019-03-27 ENCOUNTER — DOCUMENTATION ONLY (OUTPATIENT)
Dept: BARIATRICS/WEIGHT MGMT | Age: 34
End: 2019-03-27

## 2019-03-27 ENCOUNTER — HOSPITAL ENCOUNTER (OUTPATIENT)
Dept: BARIATRICS/WEIGHT MGMT | Age: 34
Discharge: HOME | End: 2019-03-27

## 2019-03-27 NOTE — PROGRESS NOTES
[address] [facility], [address]    Patient's Name: Rocky Landers   Age: 35 y.o. YOB: 1985   Sex: female    Date:   3/27/2019    Insurance:            Session: 6 of 6  Revision:   Surgeon:  Dr. Armando Wilson    Height: 5 f 7 Weight:    293      Lbs. BMI: 45.9   Pounds Lost since last month: 3               Pounds Gained since last month: 0      Starting Weight: 307   Previous Months Weight: 296  Overall Pounds Lost: 14 Overall Pounds Gained: 0      Do you smoke? None    Alcohol intake:  Number of drinks at a time:  None  Number of times a week: None    Class Guidelines    Guidelines are reviewed with patient at the start of every class. 1. Patient understands that weight loss trial classes must be consecutive. Patient understands if they miss a class, it is their responsibility to contact me to reschedule class. I will reach out to patient after their first no show. 2.  Patient understands the expectations that weight maintenance/weight loss is expected during the classes. Failure to demonstrate changes may result in one extra month of weight loss trial, followed by going back to see the surgeon. Patient understands that they CAN NOT gain any weight during the weight loss trial.  Gaining weight will result in extra classes. 3. Patient is also instructed to be doing their labs, blood work, psych visit, support group and any other test that the surgeon has used while they are working on their weight loss trial.  4.  Patient was instructed to bring their blue binder to every class and appointment. Other Pertinent Information:     Changes Made Since Last Class: Patient states she has stopped her liquid calories. Eating Habits and Behaviors    Today in class, we reviewed the key diet principles. I have talked to patient about pushing the fluid and working towards 64 ounces per day.     Patient was given ideas of liquids that would be okay. Patient was encouraged to cut out liquid calories, such as soda and sweet tea. We talked about the reasons that sugar sweetened beverages can promote weight gain. Sugar is highly palatable. Excessive consumption of sugar can trigger an exaggerated release of dopamine, which can promote a compulsive drive to consume more sugar sweetened beverages. Also, satiety is not reached with liquid calories the same way it does with solid calories. In class, we also talked about focusing on protein and low carbohydrates. Patient was encouraged during the weight loss trial to keep their carbohydrate less than 100 grams per day and their protein level at 60-80 grams per day. We talked about meal choices and snack ideas. Patient's current diet habits include: Patient states she is eating 3 meals per day. She states she is doing a snack between lunch and dinner. She states she will do a small serving of protein before her work out. Breakfast:  Patient states she eats a lot of eggs. She states she may have some avocado with this or some turkey sausage with this. Patient states she will do a protein shake when she first wakes up at 5:30. Lunch:  Baked chicken or baked salmon. She states she may have a salad with this. Lunch-Dinner: Peanut butter and celery or Greek yogurt. Dinner: Baked chicken or baked fish. She states she will prep 2 meals and use the alternative for dinner. She will also have a salad. Patient states she has cut back on the potatoes and pasta. Patient states she occasionally will have the taste for something sweet and may have one Oreo, but has otherwise cut out the sweets. Patient is drinking 3 bottles of water (20 ounce) and 1 cup of coffee per day. Physical Activity/Exercise    Comments: We talked about exercise. Patient was given reasons of why exercise is so important and how that can help with their long-term success.   I have encouraged patient to get a support system to help with the activity. Currently for activity, patient is doing an vidya called [employer]. She states that it includes high intensity activities from her phone. Behavior Modification       Comments:  During today's lesson, I also spent some time talking about behavior changes. I talked to patient about the importance of taking vitamins post op and we reviewed the vitamins that patients will be taking post op. Patient will hear this again at pre op class before surgery. Patient had the opportunity to ask questions about these vitamins that will be lifelong. Goals that patient wants to work on includes:  1. Continue with being consistent with working out. 2. Continue to keep carbohydrates low.       [facility], Nicholas Norberto 87 RD  3/27/2019

## 2019-03-27 NOTE — PROGRESS NOTES
Nutrition Evaluation    Patient's Name: Raghav Brantley   Age: 35 y.o. YOB: 1985   Sex: female    Height: 5 f 7 Weight: 293 BMI:  45.6  Starting Weight:  307        Smoking Status:  None  Alcohol Intake:  Number of Drinks at a Time: None  Number of Times a Week: None    Changes made during classes include:  - Increasing more work outs. - Looking at labels. - Cutting out carbohydrates. - Cutting out liquid calories. - Adding in protein meal before breakfast.        Two things that patient learned during this weight loss trial:  - Paying attention to how little fluid she was drinking.  - Tips on surviving the holidays. Summary:  I feel that Raghav Brantley has demonstrated appropriate diet changes and is ready to move forward with surgery. Patient has been briefed on the importance of the protein drinks, vitamins, and the transition of the diet stages. Patient understands that the long-term diet will focus on protein and vegetables. Patient understand the effects of carbohydrates after surgery and what reactive hypoglycemia is. Patient is aware that they will be attending pre-op class 2 weeks before surgery and will get more detailed information on the post-op diet guidelines. Patient will see me again at 6 weeks post-op. At this 6 week visit, RD will assess how patient is tolerating soft protein and advance to vegetables, if tolerating soft protein without difficulty. Patient will also see RD again at 9 months post-op. This visit will assess patient's compliance with current protocol, including diet, vitamins, protein shakes, and exercise. Post-op diet guidelines will be reinforced. RD is available for questions and to meet with patient outside of the 6 week and 9 month post-op visit. We spent a lot of time talking about the vitamins.   Patient understands the importance of being compliant with the diet protocol and the complications and risks that can occur if they are non-compliant with the nutritional protocol. Patient has attended at least one support group.     Candidate for surgery: Yes  Re-evaluation Date:     Procedure:  Gastric Bypass       Herb Frankel, Lu[address] Norberto [address] RD  3/27/2019

## 2019-03-28 ENCOUNTER — OFFICE VISIT (OUTPATIENT)
Dept: SURGERY | Age: 34
End: 2019-03-28

## 2019-03-28 VITALS
HEART RATE: 66 BPM | BODY MASS INDEX: 45.99 KG/M2 | HEIGHT: 67 IN | SYSTOLIC BLOOD PRESSURE: 113 MMHG | RESPIRATION RATE: 18 BRPM | DIASTOLIC BLOOD PRESSURE: 78 MMHG | TEMPERATURE: 98.1 F | WEIGHT: 293 LBS

## 2019-03-28 DIAGNOSIS — E66.01 MORBID OBESITY (HCC): Primary | ICD-10-CM

## 2019-03-28 NOTE — COMMUNICATION BODY
Preop History and Physical Exam:    Pete Olvera is a 35 y.o. female who comes into the office today after completing the entirety of the bariatric preoperative protocol satisfactorily. she initially identified obesity at the age of 12 and at age 25 weighed 210lbs. she has tried a variety of unsupervised weight-loss attempts, but has yet to meet with lasting success. Today, the patient is Height: 5' 7\" (170.2 cm) tall, Weight: 133.8 kg (295 lb) lbs for a Body mass index is 46.2 kg/m². It is due to their severe obesity, which is further complicated by hypovit D  that the patient is now seeking out bariatric surgery, specifically, the gastric bypass      Past Medical History:   Diagnosis Date    Asthma     [address]prol[address] ([facility] [address]     Hypothyroidism        History reviewed. No pertinent surgical history. Current Outpatient Medications   Medication Sig Dispense Refill    phentermine (ADIPEX_P) 15 mg capsule Take 15 mg by mouth every morning.  CHOLECALCIFEROL, VITAMIN D3, (VITAMIN D3 PO) Take 5,000 Units by mouth.          No Known Allergies    Social History     Tobacco Use    Smoking status: Never Smoker    Smokeless tobacco: Never Used   Substance Use Topics    Alcohol use: Yes     Comment: occ    Drug use: No       Family History   Problem Relation Age of Onset    Hypertension Mother     Sleep Apnea Mother     Hypertension Father      Family Status   Relation Name Status    Mother  [de-identified]    Father  Alive       Review of Systems:  Positive in BOLD     CONST: Fever, weight loss, fatigue or chills  GI: Nausea, vomiting, abdominal pain, change in bowel habits, hematochezia, melena, and GERD   INTEG: Dermatitis, abnormal moles  HEENT: Recent changes in vision, vertigo, epistaxis, dysphagia and hoarseness  CV: Chest pain, palpitations, HTN, edema and varicosities  RESP: Cough, shortness of breath, wheezing, hemoptysis, snoring and reactive airway disease  : Hematuria, dysuria, frequency, urgency, nocturia and stress urinary incontinence   MS: Weakness, joint pain - knees and arthritis  ENDO: Diabetes, thyroid disease, polyuria, polydipsia, polyphagia, poor wound healing, heat intolerance, cold intolerance  LYMPH/HEME: Anemia, bruising and history of blood transfusions  NEURO: Dizziness, headache, fainting, seizures and stroke  PSYCH: Anxiety and depression      Physical Exam    Visit Vitals  /78 (BP 1 Location: Right arm, BP Patient Position: Sitting)   Pulse 66   Temp 98.1 °F (36.7 °C) (Oral)   Resp 18   Ht 5' 7\" (1.702 m)   Wt 133.8 kg (295 lb)   BMI 46.20 kg/m²       General: 35 y.o.) female in no acute distress. Morbidly obese in hips, thighs, buttocks - gynecoid pattern  HEENT: Normocephalic, atraumatic, Pupils equal and reactive, nasopharynx clear, oropharynx clear and moist without lesions  NECK: Supple, no lymphadenopathy, thyromegaly, carotid bruits or jugular venous distension. trachea midline  RESP: Clear to auscultation bilaterally, no wheezes, rhonchi, or rales, normal respiratory excursion  CV: Regular rate and rhythm, no murmurs, rubs or gallops. 3+/4 pulses in bilateral dorsalis pedis and posterior tibialis. No distal edema or varicosities. ABD: Soft, nontender, nondistended, normoactive bowel sounds, no hernias, no hepatosplenomegaly, easily palpable costal margins, gynecoid distribution  Extremities: Warm, well perfused, no tenderness or swelling, normal gait/station  Neuro: Sensation and strength grossly intact and symmetrical  Psych: Alert and oriented to person, place, and time.         Studies:    LABS: within normal limits except for hypovit D  EKG: without significant abnormalities  NUTRITIONAL EVALUATION has deemed the patient a good candidate for weight loss surgery  PSYCHIATRIC EVALUATION has deemed the patient a good candidate for weight loss surgery  PCP - cleared    Impression:    Tamia Mckinnon is a 35 y.o. female who is suffering from morbid obesity and comorbidities including hypovit Dwho would benefit from bariatric surgery. We've discussed the restrictive and malabsorptive nature of the gastric bypass and compared and contrasted with the sleeve gastrectomy. The patient understands the likelihood of losing approximately 80% of their excess weight in 12 to 18 months. She also understands the risks including but not limited to bleeding, infection, need for reoperation, anastomotic ulcers, leaks and strictures, bowel obstruction secondary to adhesions and internal hernias, DVT, PE, heart attack, stroke, and death. Patient also understands risks of inadequate weight loss, excess weight loss, vitamin insufficiency, protein malnutrition, excess skin, and loss of hair. We have reviewed the components of a successful postoperative course including requirement for a high protein, low carbohydrate diet, 60 oz a day of zero calorie liquids, daily vitamin supplementation, daily exercise, regular follow-up, and participation in support groups. We have reviewed the preoperative liver shrinking clear liquid diet, as well as reviewed any medication changes required while on the clear liquid diet. In addition, the patient understands that all medications to be taken during the first 8 weeks postoperatively can be taken whole as long as the medication is approximately the size of a Amol 325 mg aspirin tablet in size. The patient further understands that it is his/her responsibility to review these and verify with their primary care doctor and pharmacist that all medications are of the appropriate size. We will schedule the patient for laparoscopic gastric bypass in the near future.

## 2019-03-28 NOTE — PATIENT INSTRUCTIONS
Take the following medications as noted. - If the medication is circled, take with a sip of water on the morning of surgery prior to reporting to the hospital  - If the medication has a line drawn through it, do not take that medication after starting your liquid diet before surgery  - If the medication has a star beside it, change its dosing as written beside it on the date written beside it. Current Outpatient Medications   Medication Sig Dispense Refill    phentermine (ADIPEX_P) 15 mg capsule Take 15 mg by mouth every morning.  CHOLECALCIFEROL, VITAMIN D3, (VITAMIN D3 PO) Take 5,000 Units by mouth.

## 2019-03-28 NOTE — LETTER
3/28/19 Patient: Michael Mo YOB: 1985 Date of Visit: 3/28/2019 Damion Pineda MD 
[address] [place] [address] VIA Facsimile: [phone] Dear Damion Pineda MD, Thank you for referring Ms. Alonzo Cormier to [place] [phone] for evaluation. My notes for this consultation are attached. If you have questions, please do not hesitate to call me. I look forward to following your patient along with you.  
 
 
Sincerely, 
 
Froilan Carroll MD

## 2019-03-28 NOTE — PROGRESS NOTES
Chief Complaint   Patient presents with    Morbid Obesity     pt presents today to discuss surgical options. Pt ID confirmed    Weight Loss Metrics 3/28/2019 2/11/2019 2/11/2019 10/12/2018 10/12/2018 2/27/2018   Pre op / Initial Wt - 299.4 - 307 - -   Today's Wt 295 lb - 299 lb 6.4 oz - 307 lb 306 lb   BMI 46.2 kg/m2 - 46.89 kg/m2 - 48.08 kg/m2 47.93 kg/m2   Ideal Body Wt - 140 - 140 - -   Excess Body Wt - 159.4 - 167 - -   Goal Wt - 172 - 174 - -   Wt loss to date - 0 - 0 - -   % Wt Loss - 0 - 0 - -   80% EBW - 127.52 - 133.6 - -       Body mass index is 46.2 kg/m².

## 2019-03-28 NOTE — PROGRESS NOTES
Preop History and Physical Exam:    Angus Sharp is a 35 y.o. female who comes into the office today after completing the entirety of the bariatric preoperative protocol satisfactorily. she initially identified obesity at the age of 12 and at age 25 weighed 210lbs. she has tried a variety of unsupervised weight-loss attempts, but has yet to meet with lasting success. Today, the patient is Height: 5' 7\" (170.2 cm) tall, Weight: 133.8 kg (295 lb) lbs for a Body mass index is 46.2 kg/m². It is due to their severe obesity, which is further complicated by hypovit D  that the patient is now seeking out bariatric surgery, specifically, the gastric bypass      Past Medical History:   Diagnosis Date    Asthma     Hyperprolactinemia ([facility], [place] [address]     Hypothyroidism        History reviewed. No pertinent surgical history. Current Outpatient Medications   Medication Sig Dispense Refill    phentermine (ADIPEX_P) 15 mg capsule Take 15 mg by mouth every morning.  CHOLECALCIFEROL, VITAMIN D3, (VITAMIN D3 PO) Take 5,000 Units by mouth.          No Known Allergies    Social History     Tobacco Use    Smoking status: Never Smoker    Smokeless tobacco: Never Used   Substance Use Topics    Alcohol use: Yes     Comment: occ    Drug use: No       Family History   Problem Relation Age of Onset    Hypertension Mother     Sleep Apnea Mother     Hypertension Father      Family Status   Relation Name Status    Mother  [de-identified]    Father  Alive       Review of Systems:  Positive in BOLD     CONST: Fever, weight loss, fatigue or chills  GI: Nausea, vomiting, abdominal pain, change in bowel habits, hematochezia, melena, and GERD   INTEG: Dermatitis, abnormal moles  HEENT: Recent changes in vision, vertigo, epistaxis, dysphagia and hoarseness  CV: Chest pain, palpitations, HTN, edema and varicosities  RESP: Cough, shortness of breath, wheezing, hemoptysis, snoring and reactive airway disease  : Hematuria, dysuria, frequency, urgency, nocturia and stress urinary incontinence   MS: Weakness, joint pain - knees and arthritis  ENDO: Diabetes, thyroid disease, polyuria, polydipsia, polyphagia, poor wound healing, heat intolerance, cold intolerance  LYMPH/HEME: Anemia, bruising and history of blood transfusions  NEURO: Dizziness, headache, fainting, seizures and stroke  PSYCH: Anxiety and depression      Physical Exam    Visit Vitals  /78 (BP 1 Location: Right arm, BP Patient Position: Sitting)   Pulse 66   Temp 98.1 °F (36.7 °C) (Oral)   Resp 18   Ht 5' 7\" (1.702 m)   Wt 133.8 kg (295 lb)   BMI 46.20 kg/m²       General: 35 y.o.) female in no acute distress. Morbidly obese in hips, thighs, buttocks - gynecoid pattern  HEENT: Normocephalic, atraumatic, Pupils equal and reactive, nasopharynx clear, oropharynx clear and moist without lesions  NECK: Supple, no lymphadenopathy, thyromegaly, carotid bruits or jugular venous distension. trachea midline  RESP: Clear to auscultation bilaterally, no wheezes, rhonchi, or rales, normal respiratory excursion  CV: Regular rate and rhythm, no murmurs, rubs or gallops. 3+/4 pulses in bilateral dorsalis pedis and posterior tibialis. No distal edema or varicosities. ABD: Soft, nontender, nondistended, normoactive bowel sounds, no hernias, no hepatosplenomegaly, easily palpable costal margins, gynecoid distribution  Extremities: Warm, well perfused, no tenderness or swelling, normal gait/station  Neuro: Sensation and strength grossly intact and symmetrical  Psych: Alert and oriented to person, place, and time.         Studies:    LABS: within normal limits except for hypovit D  EKG: without significant abnormalities  NUTRITIONAL EVALUATION has deemed the patient a good candidate for weight loss surgery  PSYCHIATRIC EVALUATION has deemed the patient a good candidate for weight loss surgery  PCP - cleared    Impression:    Elizabet Son is a 35 y.o. female who is suffering from morbid obesity and comorbidities including hypovit Dwho would benefit from bariatric surgery. We've discussed the restrictive and malabsorptive nature of the gastric bypass and compared and contrasted with the sleeve gastrectomy. The patient understands the likelihood of losing approximately 80% of their excess weight in 12 to 18 months. She also understands the risks including but not limited to bleeding, infection, need for reoperation, anastomotic ulcers, leaks and strictures, bowel obstruction secondary to adhesions and internal hernias, DVT, PE, heart attack, stroke, and death. Patient also understands risks of inadequate weight loss, excess weight loss, vitamin insufficiency, protein malnutrition, excess skin, and loss of hair. We have reviewed the components of a successful postoperative course including requirement for a high protein, low carbohydrate diet, 60 oz a day of zero calorie liquids, daily vitamin supplementation, daily exercise, regular follow-up, and participation in support groups. We have reviewed the preoperative liver shrinking clear liquid diet, as well as reviewed any medication changes required while on the clear liquid diet. In addition, the patient understands that all medications to be taken during the first 8 weeks postoperatively can be taken whole as long as the medication is approximately the size of a Amol 325 mg aspirin tablet in size. The patient further understands that it is his/her responsibility to review these and verify with their primary care doctor and pharmacist that all medications are of the appropriate size. We will schedule the patient for laparoscopic gastric bypass in the near future.

## 2019-04-30 ENCOUNTER — DOCUMENTATION ONLY (OUTPATIENT)
Dept: BARIATRICS/WEIGHT MGMT | Age: 34
End: 2019-04-30

## 2019-04-30 ENCOUNTER — DOCUMENTATION ONLY (OUTPATIENT)
Dept: MEDSURG UNIT | Age: 34
End: 2019-04-30

## 2019-04-30 NOTE — PROGRESS NOTES
Attended pre op class. Patient was educated on instructions below. Patient was provided a copy and all instructions were understood. Patient  was provided a copy and instructed to call with any questions. Patient was provided phone number to call. Patient verbalized understanding. Reviewed Lovenox  7-Day Preoperative Liquid Diet  Benefits:  ? Reducing intake before surgery will shrink  your liver by depleting glycogen (a form of  stored energy). ? Reduced liver size gives better access to  stomach during surgery, which translates  to a safer surgery. ? Prevents the ?last supper? syndrome. Attended pre op class. Patient was educated on instructions below. Patient was provided a copy and all instructions were understood. Patient  was provided a copy and instructed to call with any questions. Patient was provided phone number to call. Patient verbalized understanding. ? Experiencing weight loss before the  procedure encourages postoperative  compliance and ?jump-starts? weight loss. Specifics:  ? Start seven days before surgery:  ____________________.  ? NO SOLID FOODS!!   Your surgery will be CANCELED if this  diet is not followed!!!  ? Minimum of 64 ounces of fluid daily,  including protein drinks. ? No added sugar or carbonated beverages. ? Continue to take all your prescribed  medication and your vitamin supplements  during this preoperative diet phase. Clear liquids:  ? Water. ? Sugar free, non-carbonated beverages  (crystal light, propel). ? Sugar free popsicles. ? Sugar free Jell-O.  ? Fat free, reduced sodium broth . ? Decaffeinated coffee or decaffeinated tea  with artificial sweeteners. Protein:  ? 60 grams of protein daily  (in liquid supplements). ? Pre-made protein drinks. ? Protein powder added to water. ? 3 gram rule -- limit sugar and fat to less  than 3 grams per 8 ounces.   ? 4 to 6 ounces of low fat/low sugar yogurt  OR cottage cheese three to four times  during the week.  [facility] Gastric Bypass and Sleeve Dietary Progression  Date of Surgery: _ ______________________________________________________________  Ice chips start once admitted on floor. Clear liquid diet.  Begin bariatric clear liquid diet on: _ ______________________________________________   64 ounces of fluid per day. ? Low calorie, low sugar, non-carbonated beverages:  -- Water, Crystal Light, Propel Water, Sugar Free Jell-O, Sugar Free Popsicles, bouillon. -- Start protein supplement during this stage (60 to 70 grams per day). -- Getting your fluid in and staying hydrated is your number one priority! ? The clear liquid diet will last for seven days. Bariatric soft and moist diet.  Begin bariatric soft and moist diet on: _____________________________________________  ? This stage of the diet will last for five weeks, unless otherwise instructed by your surgeon. ? Begin -- one week after surgery. ? End -- six weeks after surgery (or when you follow up with the registered dietitian). ? Soft, moist, high protein foods -- three meals per day plus protein supplements. -- Portions should emphasize on soft protein. -- Portions will be a MAXIMUM of 1 ounce of solid food and 2 to 3 ounces of cottage cheese and yogurt. -- Protein supplements should be between meals and provide 30 to 40 grams per day during  soft protein diet. -- Continue to get 64 ounces of fluid in per day. ? Protein foods that are OK (SLOW TRANSITION) on the soft and moist diet:  -- First week on soft protein should focus on yogurt, cottage cheese, eggs, VEGETARIAN refried  beans, black beans, kidney beans and white beans. (NO BAKED BEANS.)  -- Second through fourth weeks should focus on yogurt, cottage cheese, eggs, canned tuna,  canned chicken, tilapia and fish (needs to be soft enough to be cut up with a fork).   -- Fifth week on soft protein diet should focus on yogurt, cottage cheese, eggs, canned tuna,  canned chicken, tilapia, fish, salmon, chicken breast or turkey. Remember to continue to get 64 ounces of fluid daily on ALL stages. To be advanced to bariatric maintenance stage of the bariatric diet, follow up with the dietitian  six weeks after surgery, around: Things I Need to Know Before Surgery  1. How many ounces of fluid will you need to drink every day after surgery? _________________  2. List three examples of bariatric clear liquids. __________________________________________________________________________  __________________________________________________________________________  __________________________________________________________________________  3. What is the 3 gram rule? ______________________________________________________  __________________________________________________________________________  __________________________________________________________________________  4. What is the 30 minute rule? ____________________________________________________  __________________________________________________________________________  __________________________________________________________________________  5. What are examples of food you will be able to eat on the bariatric soft and moist diet?  __________________________________________________________________________  __________________________________________________________________________  __________________________________________________________________________  6. After surgery I will be able to use a straw. ? TRUE ? FALSE  7. After surgery I will NOT be able to drink carbonated beverages. ? TRUE ? FALSE  -- 26 --  PATIENT GUIDE TO BARIATRIC [address] [facility]  8. After surgery I will be able to drink caffeine. ? TRUE ? FALSE  9.  What four vitamins will you take after surgery?  _____________________________________ _ ___________________________________  _____________________________________ Refugia Earl ___________________________________  10. How much exercise should you get daily? _________________________________________  __________________________________________________________________________  11. How long should it take you to eat a meal? _ _______________________________________  12. The calcium I have purchased is: ________________________________________________ . This has _________ mg per serving. I will need to take this _________ times a day. 13. The protein drink I have decided on is: ____________________________________________ . This has _________ grams of protein. I will need to drink _________ of my protein drinks  during the full liquid phase and _________ during the soft protein phase. My protein drinks  will give me _________ ounces of fluid. 14. After having a sleeve gastrectomy I will not be able to take NSAIDs  (non-steroidal anti-inflammatory drugs) for _________ weeks. 15. After having a gastric bypass I will not be able to take NSAIDs  (non-steroidal anti-inflammatory drugs) for _____________ weeks. ___________________________________________________    Medications  Please discuss all of your current medications with your surgeon at your preoperative appointment. Your surgeon will inform you regarding which medications to stop before surgery and which  medications you are to take the morning of surgery. Medications to Stop  To minimize the risk of blood loss during surgery,  you must avoid or stop taking medicines that  contain anti-inflammatories, blood thinners, arthritis  medications, and herbal supplements seven to 14 days  before your surgery. A nurse from pre-anesthesia  testing will review your list of medication. Your current  medications will be reviewed at your preoperative  appointment with your surgeon. Note: You may take prescribed narcotics, such as  Vicodin, Ultram and Neurontin.   Diabetic Medications  Adjustments in your diabetic medications will be discussed with your surgeon at your  preoperative appointment. Birth Control  In order to decrease your chance of getting a postoperative blood clot you will be required to stop  any oral contraceptive two weeks before your surgery date. During this time it is your responsibility  to use an effective form of birth control. You will be required to take a pregnancy test the morning  of surgery at the hospital and surgery will be canceled if you are pregnant. We strongly encourage  that you resume your birth control two weeks after surgery or after your first menstrual cycle  following surgery. -- 28 --  PATIENT GUIDE TO BARIATRIC [address] [place] [address] Rd  Non-Steroidal Anti-Inflammatory Drugs (NSAIDs)  Bypass:  One class of medications to avoid after Adriana-en-Y gastric  bypass is NSAIDs. These can cause ulcers or stomach irritation  and are linked to a kind of ulcer called a marginal ulcer after  gastric bypass. Marginal ulcers can bleed or perforate. Usually  they are not fatal, but they can cause months or years of pain,  and are a common cause of re-operation and reversal of gastric  bypass. You will NEVER be able to take NSAIDs again. Your only choice for over the counter pain medication will be  Tylenol (acetaminophen). Steroid use can also be harmful to your stomach but may be necessary in some situations. Please consult with your bariatric surgeon and prescribing physician for approval.  Sleeve gastrectomy:  Following a sleeve gastrectomy you will not be allowed to take NSAIDs unless it has been  discussed and approved by your surgeon. Most commonly taken NSAIDs to be AVOIDED!! 1. Ibuprofen  2. Advil  3. Motrin  4. Excedrin  5. Aspirin  6. Celebrex  7. Naproxen  8. Aleve  9. Voltaren  10. Mobic  The following is a list of NSAIDS that are NEVER to be taken again after gastric bypass surgery:  Ibuprofen which is also known as:  Advil, Advil Childrens, Advil Dagoberto Strength, Advil Liquigel,  Advil Migraine, Advil Pediatric, Childrens Ibuprofen Berry, Genpril, IBU, Midol IB, Midol Maximum  Strength Cramp Formula, Dolgesic, Motrin Childrens, Motrin IB, Motrin Infant Drops, Motrin Dagoberto  Strength, Motrin Migraine Pain, Nuprin, Migraine Liqui-gels, Ibu-Tab 200, Cap-Profen, Tab-Profen,  Profen, Ibuprohm, Children?s Elixsure, IB Pro, Vicoprofen, Combunox, A-G Profen, Actiprofen,  Addaprin, Advil Infants Concentrated Drops, Caldolor, [place], Q-Profen, Ibifon 600, Ibren,  Grimm, Midol Cramps & Bodyaches, [place], [place], [address], [place], [place], [facility]. -- 29 --  PATIENT GUIDE TO BARIATRIC [address] [place] [address]/HOSPITAL [facility]  Aspirin which has the brand name:  Arthritis Pain, Aspergum, Aspir-Low, Aspirin  Lite Coat, Amol Aspirin, Bufferin, Easprin,  Ecotrin, Empirin, Fasprin, [place], Halfprin,  [place] Aspirin, Lasana Aspirin, Excedrin. Ketoprofen which is known as: Orudis KT,  Oruvail, Actron. Sulindac which is sold as: Clinoril. Naproxen is one of the most common NSAIDs,  and is sold as: Aleve, Naprosyn, EC-Naprosyn,  Naprelan, Anaprox, All Day Pain Relief,  Aflaxen, All Day Relief, Anaprox-DS, Comfort  Pac With Naproxen, Aleve Caplet, Aleve Easy  Open Arthritis, Aleve Gelcap, Anaprox-DS,  EC-Naprosyn, Leader Naproxen Sodium, Midol  Extended Relief, Naprelan 375, Naprelan  500, Naprelan 750, Prevacid NapraPac 375,  Prevacid NapraPac, Naprapac, Vimovo. Etodolac is sold under the brand name:  Lodine XL, Lodine. Fenoprofen can be found on the market as:  Nalfon, Nalfon 200. Diclofenac sold as: Arthrotec, Cataflam,  Voltaren, Cambia, Voltaren-XR, Zipsor. Flurbiprofen sold as: Asaid. Ketorolac is sold under the brand name:  Sprix, Toradol, Toradol IM, Toradol IV/IM. Piroxicam is found on the market as: Feldene. Indomethacin known as: Indocin SR, Indocin,  Indo-Dawn, Indomethegan, Indocin IV. Mefenamic Acid sold as: Ponstel.   Meloxicam is sold under the brand name:  Mobic. Nabumetone which is sold as: Relafen. Oxaprozin which has the brand name: Daypro. Ketoprofen which has the brand name:  Actron, Orudis KT, Orudis, Oruvail. Famotidine and Ibuprofen can be found as:  Duexis. Meclofenamate sold as: Meclomen. Tolmetin which can be found on the marked  as: Tolectin, Tolectin 600, Tolectin DS. Salsalate which is sold as: [facility]. Celecoxib which is sold as: Celebrex. -- 30 --  [address] [facility]  Smoking  It is required that ALL patients stop smoking  (including e-cigarettes) and chewing tobacco  three months before surgery. Prior to surgery  your surgeon will order a test to verify that  you have quit. Your surgery will be canceled  if you are smoking. Smoking or chewing tobacco leads to  decreased blood supply to your body?s tissues  and delays healing. Smoking harms every  organ in the body and has been linked to:  ? Blood clots (the leading cause of death after  bariatric surgery). ? Marginal ulcers after gastric bypass. ? Heart disease. ? Stroke. ? Chronic obstructive pulmonary  (lung) disease. ? Increased risk for hip fracture. ? Cataracts. ? Cancer of the mouth, throat, esophagus,  larynx (voice box), stomach, pancreas,  bladder, cervix, and kidney. Pregnancy  It is in your best interest to avoid pregnancy for  12 to 18 months after surgery. Pregnancy during  the rapid weight loss phase can be dangerous  and harmful to both mother and fetus. Do you have an effective method of birth  control? Please consult with your OB/GYN or  PCP for consultation. Alcohol  Alcohol is not recommended after bariatric  surgery. Alcohol contains calories but minimal  nutrition and will work against your weight  loss goal. For example, wine contains twice  the calories per ounce that regular soda does.   The absorption of alcohol changes with gastric  bypass and gastric sleeve because an enzyme  in the stomach which usually begins to digest  alcohol is absent or greatly reduced making  alcohol more potent. Alcohol may also be absorbed more quickly  into the body after gastric bypass or gastric  sleeve. The absorbed alcohol will be more  potent, and studies have demonstrated  that obesity surgery patients reach a higher  alcohol level and maintain the higher levels for  a longer period than others. In some patients  alcohol use can increase and lead to alcohol  dependence or addiction. For all of these  reasons, it is recommended to avoid alcohol  after bariatric surgery. Things to do before surgery:  ? Start the preoperative liquid diet on: _____________________________________________  ? Stop all NSAIDS (see page 30) and aspirin seven days before my surgery: _________________ .  Donny Gonzalezor my doctor(PCP or surgeon) regarding stopping Coumadin, Plavix or  other blood thinners. ? Purchase bariatric clear liquids (Crystal Lite, sugar free Jell-O, broth, sugar free popsicles,  protein supplement) and bariatric soft and moist foods (low fat yogurt, cottage cheese, eggs,  tuna, fish, chicken) so that I?m prepared when I get home from the hospital.  ? Purchase all vitamins that will be required following surgery. o Chewable multivitamin -- two per day (ex: [place] Complete). o Calcium Citrate -- 1,500 milligrams (500 milligrams, three times a day). o Vitamin B-12 -- 1,000 micrograms daily. o Vitamin D3 -- 5,000 IU daily. ? Create a system to keep track of how many ounces of fluid I am drinking daily  o Postoperative GOAL = minimum of 64 ounces per day. ? Purchase a protein supplement that I like.  o Brand: _ _________________________________________________________________ .  o Ounces: _________________________________________________________________ .  Niesha Denise protein per serving: _________________________________________________ .   Before [address]  ? [employer] your teeth -- upon awakening, you may brush your teeth and rinse with water, but do not  swallow the water. ? Take medication -- take only the medications as instructed by your provider with a small sip of  water as soon as you get up. ? Wear proper clothing that is loose-fitting and easily removed. ? Avoid back zippers and pantyhose. ? Please remove ALL jewelry (leave ALL jewelry and valuables at home). ? Avoid using perfumes, deodorant, shaving creams or any scented lotions. ? Bring a case with your name on it to hold your eyeglasses, contact lenses, hearing aids  and dentures. If you do not see your providers clean their hands, please ask them to do so.  ? Family and friends who visit you should not touch the surgical wound or dressings. ? Family and friends should clean their hands with soap and water or an alcohol-based hand  rub before and after visiting you. If you do not see them clean their hands, ask them to clean  their hands. ? Make sure you understand how to care for your incision before you leave the hospital.  ? Always clean your hands before and after caring for your incision. ? Make sure you know who to contact if you have questions or problems after you get home. ? If you have any symptoms of an infection, such as redness and pain at the surgery site, drainage,  or fever, call your doctor immediately. ? Ask your nursing staff to help you out of bed to walk within five hours of arriving at your  hospital room. Getting out of bed to walk helps to decrease complications, such as blood clots  and pneumonia. Length of Stay  You are many types of pain control methods that are available to control discomfort. Effective  pain control will allow you to be up and walking shortly after surgery. Your doctor will choose  the method that is right for you. Regardless of the method of pain medication being used, it is  important for you to communicate with your nurse if the pain medication is not enough.  Call your  nurse for pain medication when the pain is moderate instead of waiting for when pain is severe. What type of pain should I expect? ? Abdominal pain  ? Rib pain  ? Shoulder pain  ? [place] feeling in the center of your chest  Nausea:  Nausea following bariatric surgery is very  common. Causes include:  ? Anesthesia  ? Drinking too fastYou will be allowed 1 to 2 ounces of ice chips per hour when you are admitted to the floor. They are solely for your comfort. They are not required. ? You will be expected to walk the night of your surgery. Please ask your nurse or nursing assistant  for help the first time you walk. Please do not walk if you still feel groggy or unsteady on your feet. ? Your pain will be controlled with oral and IV pain medication on the day of surgery. ? In order to prevent pneumonia after surgery you please use your incentive spirometer (ICS)  at least 10 times per hour (see below). ? Pain medication  ? Surgery itself  I understand the serious potential complications include: deep venous thrombosis/pulmonary  embolism (blood clots in the legs and or lungs); gastrointestinal leak (leakage of digestive contents  into the abdomen); sepsis (serious infection); injury to adjacent organs such as esophagus, spleen,  pancreas, liver, diaphragm (requiring intervention or surgical removal); excessive bleeding; bowel  obstruction (blockage of the intestines); or organ failure. I am informed that these complications  can be life threatening. The overall mortality rate (risk of death) from bariatric surgery is close to  0.1 percent (1/1,000), but can be as high as 0.5 percent (1/200) for some patients. Patient initials: ______________  I understand that complications requiring re-operation may occur, either immediately after initial  surgery, or later in the recovery process.   Patient initials: ______________  Other potential complications which I may have include: wound infections or seromas  (fluid collection under skin); hernias (breakdown of tissue holding in abdominal contents);  gastritis or stomach ulcer (inflammation of the stomach); formation of gallstones; formation  of kidney stones or urinary tract infection; or pneumonia. Device related complications such as  foreign body reaction, band slippage, or erosion may occur with the adjustable gastric bandIwill  Pre-op instructions:  1. Shower with the antibacterial soap  the 3 days prior to surgery. 2. Pre-admission testing will contact you 1 week before surgery  3. [facility] will contact you the day before surgery to confirm your surgery time.  Email or call your surgery scheduler if you have not heard from them by 3pm  4. Nothing to eat or drink (NPO) after midnight the night before surgery.  Take any evening medications by 11pm  5. Wear comfortable clothing. 6. Do not bring any valuables. 7. Bring insurance card, prescription card, photo ID and credit card to the hospital.  **Discuss all home medications with your surgeon at you pre-op appointment. Your surgeon will inform you of what medications to stop before surgery and what medications to take the day of surgery. **STOP all NSAIDS (Ibuprofen, Aleve, Advil, Naprosyn etc.) and Aspirin 7 days before your surgery      Important Information:  1. No lifting over 15 lbs. for 6 weeks post-op  2. No driving for 3-44 days after surgery - must be off pain medication. 3. No smoking EVER again! 4. You will NOT be able to take any Non-Steroidal Anti-inflammatories (NSAIDS), Aspirin or Steroids  a. Bypass/revision patients - EVER again. (Tylenol only)  b. Sleeve patients - 6 weeks after surgery  5. Pulse/temperature- twice daily for 2 weeks post-op   6. Avoid pregnancy for 18 months  7. Key Diet principles:  a. Vitamin/mineral supplements-[place] Complete, Calcium citrate, Vitamin D3, Vitamin B12  b. Protein supplements - 60-70 grams/day  c. Minimum 64 oz. fluid per day  d.        What to Expect in the Hospital:  Pre-op area:  o Emergency door take elevator 2nd floor  o Arrive 1.5 hours before surgery  o Lovenox (blood thinner)  o Incentive Spirometer  o SCDs  o Sign consent  o Anesthesia  Start IV    Operating Room:    o Gastric bypass: 2- 2 ½ hours  o Sleeve gastrectomy: 1-1 ½ hours  o Revision: 2-3 hours    PACU(Post Anesthesia Care Unit):  o Nasal cannula  o EKG monitor  o Blood pressure cuff  o Pulse Ox  o Joseph Catheter  o SCDs  o No family allowed  o Minimum one hour  There are many types of pain control methods that are available to control discomfort. Effective  pain control will allow you to be up and walking shortly after surgery. Your doctor will choose  the method that is right for you. Regardless of the method of pain medication being used, it is  important for you to communicate with your nurse if the pain medication is not enough. Call your  nurse for pain medication when the pain is moderate instead of waiting for when pain is severe. What type of pain should I expect? ? Abdominal pain  ? Rib pain  ? Shoulder pain  ? [place] feeling in the center of your chest  Nausea:  Nausea following bariatric surgery is very  common. Causes include:  ? Anesthesia  ? Drinking too fast  ? Pain medication  ? Surgery itself    Expectations on your Day of Surgery  ? You will be allowed 1 to 2 ounces of ice chips per hour when you are admitted to the floor. They are solely for your comfort. They are not required. ? You will be expected to walk the night of your surgery. Please ask your nurse or nursing assistant  for help the first time you walk. Please do not walk if you still feel groggy or unsteady on your feet. ? Your pain will be controlled with oral and IV pain medication on the day of surgery.   ? In order to prevent pneumonia after surgery you please use your incentive spirometer (ICS)  at least 10 times per hour (see below    2 Central (Day of Surgery):  o Private room  o 1-2 oz. ice chips per hour   o IV Dilaudid  or liquid pain medication as needed for pain  o Discontinue oliver catheter  o Walk within 4 hours  o One family member can spend the night (must 18 years or older)  o Cell phone/computers - OK    2 Central (Post-op Day 1/Post-op Day 2):  o 7am - Gastrograffin swallow study (X-ray) for:  o All sleeve gastrectomy patients  o All revision patients   o Discontinue -nasal cannula and heart rate monitor  o Start bariatric clear liquid diet - water, crystal light, broth, Jell-O and protein supplement  o Slowly increase to 3 oz. clear liquids and 1 oz. protein supplement per hour  o Oral pain medication as needed for pain - communicate with your nurse  o Goal:  48 to 64 ounces, clear liquid per day preferable water  o Discharge instructions/Lovenox self-injection instructions   o WALK & WATER    Post-op Goals:  1. Void within 8 hours of your catheter being removed  2. Pain is well controlled with oral pain medication  3. Nausea is under control/No vomiting  4. Tolerating 3 oz. of clear liquids and 1 oz. protein supplement per hour for 3 consecutive hours. Post-op Follow-up Labs will need to be completed 1-2 weeks BEFORE your 3 month, 6 month and yearly visits. These labs are required and your appointment will be rescheduled if they are not completed. My surgical procedure and post-operative hospital stay has been discussed with me and I have been given the opportunity to ask any questions I may have.     Patient Signature: ____________________________  Date: _____________________

## 2019-04-30 NOTE — PROGRESS NOTES
CLINICAL NUTRITION PRE-OPERATIVE EDUCATION    Patient's Name: Vanesa Hou   Age: 35 y.o. YOB: 1985   Sex: female    Education & Materials Provided:   x Excel Menu/Allowable Foods/ Shopping List   x  Supplemental Resource Guide: MVI, B12, Calcium Citrate, Vitamin D         recommendations  x  Protein Supplement Information  x  Fluid Requirements/ No Straws  x  No Caffeine or Carbonation   x  No alcohol for 1-Year Post-Op                     x  No Snacks or No Concentrated Sweets     x  Exercising   x  Support System at [facility] of Support Group meetings. Support System after surgery includes: x     x  Key Diet Principles            x  Addressed Current Habits/Changes to Make   x Patient has been educated on the liquid diet to begin 1 week prior to surgery. Attendance of support group: Yes      Summary:  Patient has completed 6 visits with me. During these nutrition visits, we focused on dietary changes, behavior changes, and the importance of establishing an exercise routine. The diet protocol that patient was prescribed emphasized on low carbohydrates (less than 100 grams per day prior to surgery and 60-80 grams of protein per day). At today's session, patient was educated on the post-op diet protocol. Patient understands the importance of keeping total fat and sugar less than 3 grams per serving. Patient is aware of the transition of the diet stages and is aware that they will be on clear liquids for 7days, followed by soft protein for 5 weeks. Patient understands the body needs ~ 60-70 grams of protein per day. During the liquid phase, patient will need 60 grams of protein from shakes. Once eating soft protein, patient will only need 1 shake per day. Patient is aware of the importance of the vitamins and protein drinks. Patient has also been educated on the pre-op liquid diet, which will range from 1-2 weeks (depending on surgeon).   Patient understands that failure to comply in pre-op liquid diet could result in surgery being canceled. Patient's 6 week post-op nutrition appointment has been scheduled for: June 19 at 8 am.  [id number] [address] Gupta to proceed. At this 6 week visit, RD will assess how patient is tolerating soft protein and advance to vegetables, if tolerating soft protein without difficulty. Patient will also see RD again at 9 months post-op. This visit will assess patient's compliance with current protocol, including diet, vitamins, protein shakes, and exercise. Post-op diet guidelines will be reinforced. RD is available for questions and to meet with patient outside of the 6 week and 9 month post-op visit. We spent a lot of time talking about the vitamins. Patient understands the importance of being compliant with the diet protocol and the complications and risks that can occur if they are non-compliant with the nutritional protocol.        Candidate for surgery: Yes  Re-evaluation Date:     Nicholas Salgado 87 RD  4/30/2019

## 2019-05-06 NOTE — PERIOP NOTES
PAT - SURGICAL PRE-ADMISSION INSTRUCTIONS 
 
NAME:  Mariana Martinez                                                          TODAY'S DATE:  5/6/2019 SURGERY DATE:  5/8/2019                                  SURGERY ARRIVAL TIME:   TBV 1. Do NOT eat or drink anything, including candy or gum, after MIDNIGHT on 05/07/2019 , unless you have specific instructions from your Surgeon or Anesthesia Provider to do so. 2. No smoking on the day of surgery. 3. No alcohol 24 hours prior to the day of surgery. 4. No recreational drugs for one week prior to the day of surgery. 5. Leave all valuables, including money/purse, at home. 6. Remove all jewelry, nail polish, makeup (including mascara); no lotions, powders, deodorant, or perfume/cologne/after shave. 7. Glasses/Contact lenses and Dentures may be worn to the hospital.  They will be removed prior to surgery. 8. Call your doctor if symptoms of a cold or illness develop within 24 ours prior to surgery. 9. AN ADULT MUST DRIVE YOU HOME AFTER OUTPATIENT SURGERY. 10. If you are having an OUTPATIENT procedure, please make arrangements for a responsible adult to be with you for 24 hours after your surgery. 11. If you are admitted to the hospital, you will be assigned to a bed after surgery is complete. Normally a family member will not be able to see you until you are in your assigned bed. 15. Family is encouraged to accompany you to the hospital.  We ask visitors in the treatment area to be limited to ONE person at a time to ensure patient privacy. EXCEPTIONS WILL BE MADE AS NEEDED. 15. Children under 12 are discouraged from entering the treatment area and need to be supervised by an adult when in the waiting room. Special Instructions: 
 
NONE. Patient Prep: 
 
shower with anti-bacterial soap These surgical instructions were reviewed with Mercedes Fink during the PAT phone call. Directions:   On the morning of surgery, please go to the [facility]. Enter the building from the [facility] entrance, 1st floor (next to the Emergency Room entrance). Take the elevator to the 2nd floor. Sign in at the Registration Desk. If you have any questions and/or concerns, please do not hesitate to call: 
(Prior to the day of surgery)  [facility] unit:  [phone] (Day of surgery)  [facility] unit:  [phone]

## 2019-05-07 ENCOUNTER — ANESTHESIA EVENT (OUTPATIENT)
Dept: SURGERY | Age: 34
DRG: 621 | End: 2019-05-07
Payer: COMMERCIAL

## 2019-05-08 ENCOUNTER — HOSPITAL ENCOUNTER (INPATIENT)
Age: 34
LOS: 1 days | Discharge: HOME | DRG: 621 | End: 2019-05-09
Attending: SURGERY | Admitting: SURGERY
Payer: COMMERCIAL

## 2019-05-08 ENCOUNTER — ANESTHESIA (OUTPATIENT)
Dept: SURGERY | Age: 34
DRG: 621 | End: 2019-05-08
Payer: COMMERCIAL

## 2019-05-08 DIAGNOSIS — G89.18 POST-OP PAIN: Primary | ICD-10-CM

## 2019-05-08 DIAGNOSIS — L76.82 INCISIONAL PAIN: ICD-10-CM

## 2019-05-08 LAB — HCG UR QL: NEGATIVE

## 2019-05-08 PROCEDURE — 77030033639 HC SHR ENDO COAG HARM 36 J&J -E: Performed by: SURGERY

## 2019-05-08 PROCEDURE — 74011250636 HC RX REV CODE- 250/636

## 2019-05-08 PROCEDURE — 74011000250 HC RX REV CODE- 250: Performed by: SURGERY

## 2019-05-08 PROCEDURE — 77030002933 HC SUT MCRYL J&J -A: Performed by: SURGERY

## 2019-05-08 PROCEDURE — 77030008603 HC TRCR ENDOSC EPATH J&J -C: Performed by: SURGERY

## 2019-05-08 PROCEDURE — 77030035051: Performed by: SURGERY

## 2019-05-08 PROCEDURE — 77030036732 HC RELD STPLR VASC J&J -F: Performed by: SURGERY

## 2019-05-08 PROCEDURE — 76060000035 HC ANESTHESIA 2 TO 2.5 HR: Performed by: SURGERY

## 2019-05-08 PROCEDURE — 77030008477 HC STYL SATN SLP COVD -A: Performed by: ANESTHESIOLOGY

## 2019-05-08 PROCEDURE — 74011250636 HC RX REV CODE- 250/636: Performed by: NURSE ANESTHETIST, CERTIFIED REGISTERED

## 2019-05-08 PROCEDURE — 77030002996 HC SUT SLK J&J -A: Performed by: SURGERY

## 2019-05-08 PROCEDURE — 76010000131 HC OR TIME 2 TO 2.5 HR: Performed by: SURGERY

## 2019-05-08 PROCEDURE — 74011250636 HC RX REV CODE- 250/636: Performed by: SURGERY

## 2019-05-08 PROCEDURE — 76210000016 HC OR PH I REC 1 TO 1.5 HR: Performed by: SURGERY

## 2019-05-08 PROCEDURE — 77030027491 HC SHR ENDOSC COVD -B: Performed by: SURGERY

## 2019-05-08 PROCEDURE — 77030027876 HC STPLR ENDOSC FLX PWR J&J -G1: Performed by: SURGERY

## 2019-05-08 PROCEDURE — 77030035048 HC TRCR ENDOSC OPTCL COVD -B: Performed by: SURGERY

## 2019-05-08 PROCEDURE — 74011000250 HC RX REV CODE- 250

## 2019-05-08 PROCEDURE — 77030009968 HC RELD STPLR ENDOSC J&J -D: Performed by: SURGERY

## 2019-05-08 PROCEDURE — 77030005518 HC CATH URETH FOL 2W BARD -B: Performed by: SURGERY

## 2019-05-08 PROCEDURE — 77030018831 HC SOL IRR H20 BAXT -A: Performed by: SURGERY

## 2019-05-08 PROCEDURE — 77030013079 HC BLNKT BAIR HGGR 3M -A: Performed by: ANESTHESIOLOGY

## 2019-05-08 PROCEDURE — 77030016151 HC PROTCTR LNS DFOG COVD -B: Performed by: SURGERY

## 2019-05-08 PROCEDURE — 77030008598 HC TRCR ENDOSC BLDLS J&J -B: Performed by: SURGERY

## 2019-05-08 PROCEDURE — 77030032490 HC SLV COMPR SCD KNE COVD -B: Performed by: SURGERY

## 2019-05-08 PROCEDURE — 77030008683 HC TU ET CUF COVD -A: Performed by: ANESTHESIOLOGY

## 2019-05-08 PROCEDURE — 65270000029 HC RM PRIVATE

## 2019-05-08 PROCEDURE — 74011250637 HC RX REV CODE- 250/637: Performed by: SURGERY

## 2019-05-08 PROCEDURE — 77030039266 HC ADH SKN EXOFIN S2SG -A: Performed by: SURGERY

## 2019-05-08 PROCEDURE — 0D164ZA BYPASS STOMACH TO JEJUNUM, PERCUTANEOUS ENDOSCOPIC APPROACH: ICD-10-PCS | Performed by: SURGERY

## 2019-05-08 PROCEDURE — 77030008437 HC REINF STRP REINF SEMGD WLGO -C: Performed by: SURGERY

## 2019-05-08 PROCEDURE — 81025 URINE PREGNANCY TEST: CPT

## 2019-05-08 PROCEDURE — 77030031139 HC SUT VCRL2 J&J -A: Performed by: SURGERY

## 2019-05-08 RX ORDER — DIPHENHYDRAMINE HYDROCHLORIDE 50 MG/ML
INJECTION, SOLUTION INTRAMUSCULAR; INTRAVENOUS
Status: DISPENSED
Start: 2019-05-08 | End: 2019-05-08

## 2019-05-08 RX ORDER — OXYCODONE HYDROCHLORIDE 5 MG/1
5 TABLET ORAL
Status: DISCONTINUED | OUTPATIENT
Start: 2019-05-08 | End: 2019-05-09 | Stop reason: HOSPADM

## 2019-05-08 RX ORDER — ACETAMINOPHEN 650 MG/1
650 SUPPOSITORY RECTAL ONCE
Status: DISCONTINUED | OUTPATIENT
Start: 2019-05-08 | End: 2019-05-08 | Stop reason: HOSPADM

## 2019-05-08 RX ORDER — DIPHENHYDRAMINE HYDROCHLORIDE 50 MG/ML
25 INJECTION, SOLUTION INTRAMUSCULAR; INTRAVENOUS
Status: DISCONTINUED | OUTPATIENT
Start: 2019-05-08 | End: 2019-05-09 | Stop reason: HOSPADM

## 2019-05-08 RX ORDER — ENOXAPARIN SODIUM 100 MG/ML
40 INJECTION SUBCUTANEOUS ONCE
Status: COMPLETED | OUTPATIENT
Start: 2019-05-08 | End: 2019-05-08

## 2019-05-08 RX ORDER — ONDANSETRON 2 MG/ML
4 INJECTION INTRAMUSCULAR; INTRAVENOUS
Status: DISCONTINUED | OUTPATIENT
Start: 2019-05-08 | End: 2019-05-09 | Stop reason: HOSPADM

## 2019-05-08 RX ORDER — ENOXAPARIN SODIUM 100 MG/ML
40 INJECTION SUBCUTANEOUS DAILY
Status: DISCONTINUED | OUTPATIENT
Start: 2019-05-09 | End: 2019-05-09 | Stop reason: HOSPADM

## 2019-05-08 RX ORDER — PROPOFOL 10 MG/ML
INJECTION, EMULSION INTRAVENOUS AS NEEDED
Status: DISCONTINUED | OUTPATIENT
Start: 2019-05-08 | End: 2019-05-08 | Stop reason: HOSPADM

## 2019-05-08 RX ORDER — FENTANYL CITRATE 50 UG/ML
50 INJECTION, SOLUTION INTRAMUSCULAR; INTRAVENOUS AS NEEDED
Status: DISCONTINUED | OUTPATIENT
Start: 2019-05-08 | End: 2019-05-08 | Stop reason: HOSPADM

## 2019-05-08 RX ORDER — FENTANYL CITRATE 50 UG/ML
INJECTION, SOLUTION INTRAMUSCULAR; INTRAVENOUS AS NEEDED
Status: DISCONTINUED | OUTPATIENT
Start: 2019-05-08 | End: 2019-05-08 | Stop reason: HOSPADM

## 2019-05-08 RX ORDER — SODIUM CHLORIDE, SODIUM LACTATE, POTASSIUM CHLORIDE, CALCIUM CHLORIDE 600; 310; 30; 20 MG/100ML; MG/100ML; MG/100ML; MG/100ML
125 INJECTION, SOLUTION INTRAVENOUS CONTINUOUS
Status: DISCONTINUED | OUTPATIENT
Start: 2019-05-08 | End: 2019-05-08 | Stop reason: HOSPADM

## 2019-05-08 RX ORDER — ACETAMINOPHEN 120 MG/1
SUPPOSITORY RECTAL AS NEEDED
Status: DISCONTINUED | OUTPATIENT
Start: 2019-05-08 | End: 2019-05-08 | Stop reason: HOSPADM

## 2019-05-08 RX ORDER — ACETAMINOPHEN 325 MG/1
650 TABLET ORAL EVERY 6 HOURS
Status: DISCONTINUED | OUTPATIENT
Start: 2019-05-08 | End: 2019-05-09 | Stop reason: HOSPADM

## 2019-05-08 RX ORDER — LIDOCAINE HYDROCHLORIDE 10 MG/ML
0.1 INJECTION, SOLUTION EPIDURAL; INFILTRATION; INTRACAUDAL; PERINEURAL AS NEEDED
Status: DISCONTINUED | OUTPATIENT
Start: 2019-05-08 | End: 2019-05-08 | Stop reason: HOSPADM

## 2019-05-08 RX ORDER — INSULIN LISPRO 100 [IU]/ML
INJECTION, SOLUTION INTRAVENOUS; SUBCUTANEOUS ONCE
Status: DISCONTINUED | OUTPATIENT
Start: 2019-05-08 | End: 2019-05-08 | Stop reason: HOSPADM

## 2019-05-08 RX ORDER — BISMUTH SUBSALICYLATE 262 MG
1 TABLET,CHEWABLE ORAL 2 TIMES DAILY
COMMUNITY

## 2019-05-08 RX ORDER — SODIUM CHLORIDE, SODIUM LACTATE, POTASSIUM CHLORIDE, CALCIUM CHLORIDE 600; 310; 30; 20 MG/100ML; MG/100ML; MG/100ML; MG/100ML
150 INJECTION, SOLUTION INTRAVENOUS CONTINUOUS
Status: DISCONTINUED | OUTPATIENT
Start: 2019-05-08 | End: 2019-05-09 | Stop reason: HOSPADM

## 2019-05-08 RX ORDER — GLYCOPYRROLATE 0.2 MG/ML
INJECTION INTRAMUSCULAR; INTRAVENOUS AS NEEDED
Status: DISCONTINUED | OUTPATIENT
Start: 2019-05-08 | End: 2019-05-08 | Stop reason: HOSPADM

## 2019-05-08 RX ORDER — NALOXONE HYDROCHLORIDE 0.4 MG/ML
0.4 INJECTION, SOLUTION INTRAMUSCULAR; INTRAVENOUS; SUBCUTANEOUS AS NEEDED
Status: DISCONTINUED | OUTPATIENT
Start: 2019-05-08 | End: 2019-05-09 | Stop reason: HOSPADM

## 2019-05-08 RX ORDER — MIDAZOLAM HYDROCHLORIDE 1 MG/ML
INJECTION, SOLUTION INTRAMUSCULAR; INTRAVENOUS AS NEEDED
Status: DISCONTINUED | OUTPATIENT
Start: 2019-05-08 | End: 2019-05-08 | Stop reason: HOSPADM

## 2019-05-08 RX ORDER — ONDANSETRON 2 MG/ML
4 INJECTION INTRAMUSCULAR; INTRAVENOUS ONCE
Status: COMPLETED | OUTPATIENT
Start: 2019-05-08 | End: 2019-05-08

## 2019-05-08 RX ORDER — SUCCINYLCHOLINE CHLORIDE 20 MG/ML
INJECTION INTRAMUSCULAR; INTRAVENOUS AS NEEDED
Status: DISCONTINUED | OUTPATIENT
Start: 2019-05-08 | End: 2019-05-08 | Stop reason: HOSPADM

## 2019-05-08 RX ORDER — HYDROMORPHONE HYDROCHLORIDE 1 MG/ML
1 INJECTION, SOLUTION INTRAMUSCULAR; INTRAVENOUS; SUBCUTANEOUS
Status: DISCONTINUED | OUTPATIENT
Start: 2019-05-08 | End: 2019-05-09 | Stop reason: HOSPADM

## 2019-05-08 RX ORDER — HYDROMORPHONE HYDROCHLORIDE 1 MG/ML
INJECTION, SOLUTION INTRAMUSCULAR; INTRAVENOUS; SUBCUTANEOUS AS NEEDED
Status: DISCONTINUED | OUTPATIENT
Start: 2019-05-08 | End: 2019-05-08 | Stop reason: HOSPADM

## 2019-05-08 RX ORDER — FAMOTIDINE 20 MG/1
20 TABLET, FILM COATED ORAL ONCE
Status: DISCONTINUED | OUTPATIENT
Start: 2019-05-08 | End: 2019-05-08

## 2019-05-08 RX ORDER — NEOSTIGMINE METHYLSULFATE 5 MG/5 ML
SYRINGE (ML) INTRAVENOUS AS NEEDED
Status: DISCONTINUED | OUTPATIENT
Start: 2019-05-08 | End: 2019-05-08 | Stop reason: HOSPADM

## 2019-05-08 RX ORDER — DEXAMETHASONE SODIUM PHOSPHATE 4 MG/ML
INJECTION, SOLUTION INTRA-ARTICULAR; INTRALESIONAL; INTRAMUSCULAR; INTRAVENOUS; SOFT TISSUE AS NEEDED
Status: DISCONTINUED | OUTPATIENT
Start: 2019-05-08 | End: 2019-05-08 | Stop reason: HOSPADM

## 2019-05-08 RX ORDER — HYDROMORPHONE HYDROCHLORIDE 2 MG/ML
0.5 INJECTION, SOLUTION INTRAMUSCULAR; INTRAVENOUS; SUBCUTANEOUS
Status: DISCONTINUED | OUTPATIENT
Start: 2019-05-08 | End: 2019-05-08 | Stop reason: HOSPADM

## 2019-05-08 RX ORDER — ONDANSETRON 2 MG/ML
INJECTION INTRAMUSCULAR; INTRAVENOUS AS NEEDED
Status: DISCONTINUED | OUTPATIENT
Start: 2019-05-08 | End: 2019-05-08 | Stop reason: HOSPADM

## 2019-05-08 RX ORDER — LIDOCAINE HYDROCHLORIDE 20 MG/ML
INJECTION, SOLUTION EPIDURAL; INFILTRATION; INTRACAUDAL; PERINEURAL AS NEEDED
Status: DISCONTINUED | OUTPATIENT
Start: 2019-05-08 | End: 2019-05-08 | Stop reason: HOSPADM

## 2019-05-08 RX ORDER — VECURONIUM BROMIDE FOR INJECTION 1 MG/ML
INJECTION, POWDER, LYOPHILIZED, FOR SOLUTION INTRAVENOUS AS NEEDED
Status: DISCONTINUED | OUTPATIENT
Start: 2019-05-08 | End: 2019-05-08 | Stop reason: HOSPADM

## 2019-05-08 RX ORDER — HYOSCYAMINE SULFATE 0.12 MG/1
0.12 TABLET, ORALLY DISINTEGRATING ORAL
Status: DISCONTINUED | OUTPATIENT
Start: 2019-05-08 | End: 2019-05-09 | Stop reason: HOSPADM

## 2019-05-08 RX ORDER — KETOROLAC TROMETHAMINE 30 MG/ML
15 INJECTION, SOLUTION INTRAMUSCULAR; INTRAVENOUS EVERY 6 HOURS
Status: COMPLETED | OUTPATIENT
Start: 2019-05-08 | End: 2019-05-09

## 2019-05-08 RX ORDER — SODIUM CHLORIDE, SODIUM LACTATE, POTASSIUM CHLORIDE, CALCIUM CHLORIDE 600; 310; 30; 20 MG/100ML; MG/100ML; MG/100ML; MG/100ML
25 INJECTION, SOLUTION INTRAVENOUS CONTINUOUS
Status: DISCONTINUED | OUTPATIENT
Start: 2019-05-08 | End: 2019-05-08 | Stop reason: HOSPADM

## 2019-05-08 RX ORDER — FAMOTIDINE 10 MG/ML
INJECTION INTRAVENOUS
Status: COMPLETED
Start: 2019-05-08 | End: 2019-05-08

## 2019-05-08 RX ADMIN — ONDANSETRON 4 MG: 2 INJECTION INTRAMUSCULAR; INTRAVENOUS at 12:40

## 2019-05-08 RX ADMIN — KETOROLAC TROMETHAMINE 15 MG: 30 INJECTION, SOLUTION INTRAMUSCULAR at 18:15

## 2019-05-08 RX ADMIN — MIDAZOLAM HYDROCHLORIDE 2 MG: 1 INJECTION, SOLUTION INTRAMUSCULAR; INTRAVENOUS at 07:30

## 2019-05-08 RX ADMIN — PROPOFOL 200 MG: 10 INJECTION, EMULSION INTRAVENOUS at 07:34

## 2019-05-08 RX ADMIN — SODIUM CHLORIDE, SODIUM LACTATE, POTASSIUM CHLORIDE, AND CALCIUM CHLORIDE 150 ML/HR: 600; 310; 30; 20 INJECTION, SOLUTION INTRAVENOUS at 18:20

## 2019-05-08 RX ADMIN — SODIUM CHLORIDE, SODIUM LACTATE, POTASSIUM CHLORIDE, AND CALCIUM CHLORIDE 150 ML/HR: 600; 310; 30; 20 INJECTION, SOLUTION INTRAVENOUS at 11:32

## 2019-05-08 RX ADMIN — VECURONIUM BROMIDE FOR INJECTION 4 MG: 1 INJECTION, POWDER, LYOPHILIZED, FOR SOLUTION INTRAVENOUS at 07:43

## 2019-05-08 RX ADMIN — SODIUM CHLORIDE, SODIUM LACTATE, POTASSIUM CHLORIDE, AND CALCIUM CHLORIDE 25 ML/HR: 600; 310; 30; 20 INJECTION, SOLUTION INTRAVENOUS at 06:49

## 2019-05-08 RX ADMIN — FAMOTIDINE: 10 INJECTION, SOLUTION INTRAVENOUS at 06:49

## 2019-05-08 RX ADMIN — Medication 3 MG: at 09:30

## 2019-05-08 RX ADMIN — ACETAMINOPHEN 650 MG: 325 TABLET, FILM COATED ORAL at 20:39

## 2019-05-08 RX ADMIN — FENTANYL CITRATE 100 MCG: 50 INJECTION, SOLUTION INTRAMUSCULAR; INTRAVENOUS at 07:34

## 2019-05-08 RX ADMIN — GLYCOPYRROLATE 0.4 MG: 0.2 INJECTION INTRAMUSCULAR; INTRAVENOUS at 09:30

## 2019-05-08 RX ADMIN — ACETAMINOPHEN 650 MG: 325 TABLET, FILM COATED ORAL at 15:03

## 2019-05-08 RX ADMIN — HYDROMORPHONE HYDROCHLORIDE 0.5 MG: 2 INJECTION INTRAMUSCULAR; INTRAVENOUS; SUBCUTANEOUS at 10:10

## 2019-05-08 RX ADMIN — LIDOCAINE HYDROCHLORIDE 50 MG: 20 INJECTION, SOLUTION EPIDURAL; INFILTRATION; INTRACAUDAL; PERINEURAL at 07:34

## 2019-05-08 RX ADMIN — ENOXAPARIN SODIUM 40 MG: 40 INJECTION SUBCUTANEOUS at 06:49

## 2019-05-08 RX ADMIN — ONDANSETRON 4 MG: 2 INJECTION INTRAMUSCULAR; INTRAVENOUS at 10:00

## 2019-05-08 RX ADMIN — ONDANSETRON 4 MG: 2 INJECTION INTRAMUSCULAR; INTRAVENOUS at 07:46

## 2019-05-08 RX ADMIN — KETOROLAC TROMETHAMINE 15 MG: 30 INJECTION, SOLUTION INTRAMUSCULAR at 11:15

## 2019-05-08 RX ADMIN — DEXAMETHASONE SODIUM PHOSPHATE 4 MG: 4 INJECTION, SOLUTION INTRA-ARTICULAR; INTRALESIONAL; INTRAMUSCULAR; INTRAVENOUS; SOFT TISSUE at 07:46

## 2019-05-08 RX ADMIN — HYDROMORPHONE HYDROCHLORIDE 0.5 MG: 2 INJECTION INTRAMUSCULAR; INTRAVENOUS; SUBCUTANEOUS at 10:25

## 2019-05-08 RX ADMIN — SODIUM CHLORIDE, SODIUM LACTATE, POTASSIUM CHLORIDE, AND CALCIUM CHLORIDE: 600; 310; 30; 20 INJECTION, SOLUTION INTRAVENOUS at 08:36

## 2019-05-08 RX ADMIN — VECURONIUM BROMIDE FOR INJECTION 1 MG: 1 INJECTION, POWDER, LYOPHILIZED, FOR SOLUTION INTRAVENOUS at 07:34

## 2019-05-08 RX ADMIN — SUCCINYLCHOLINE CHLORIDE 100 MG: 20 INJECTION INTRAMUSCULAR; INTRAVENOUS at 07:34

## 2019-05-08 RX ADMIN — HYDROMORPHONE HYDROCHLORIDE 1 MG: 1 INJECTION, SOLUTION INTRAMUSCULAR; INTRAVENOUS; SUBCUTANEOUS at 08:26

## 2019-05-08 RX ADMIN — CEFAZOLIN 3 G: 1 INJECTION, POWDER, FOR SOLUTION INTRAMUSCULAR; INTRAVENOUS; PARENTERAL at 07:35

## 2019-05-08 RX ADMIN — HYOSCYAMINE SULFATE 0.12 MG: 0.12 TABLET, ORALLY DISINTEGRATING ORAL at 11:16

## 2019-05-08 RX ADMIN — OXYCODONE HYDROCHLORIDE 5 MG: 5 TABLET ORAL at 12:40

## 2019-05-08 NOTE — H&P
Preop History and Physical Exam: 
  
Morena Chu is a 35 y.o. female who comes into the office today after completing the entirety of the bariatric preoperative protocol satisfactorily. she initially identified obesity at the age of 12 and at age 25 weighed 210lbs. she has tried a variety of unsupervised weight-loss attempts, but has yet to meet with lasting success. Today, the patient is Height: 5' 7\" (170.2 cm) tall, Weight: 133.8 kg (295 lb) lbs for a Body mass index is 46.2 kg/m². It is due to their severe obesity, which is further complicated by hypovit D  that the patient is now seeking out bariatric surgery, specifically, the gastric bypass 
  
  
    
Past Medical History:  
Diagnosis Date  Asthma    
 Hyperprolactinemia ([facility] [address]    
 Hypothyroidism    
  
  
History reviewed. No pertinent surgical history. 
  
      
Current Outpatient Medications Medication Sig Dispense Refill  phentermine (ADIPEX_P) 15 mg capsule Take 15 mg by mouth every morning.      
 CHOLECALCIFEROL, VITAMIN D3, (VITAMIN D3 PO) Take 5,000 Units by mouth.      
  
  
No Known Allergies 
  
Social History  
  
     
Tobacco Use  Smoking status: Never Smoker  Smokeless tobacco: Never Used Substance Use Topics  Alcohol use: Yes  
    Comment: occ  Drug use: No  
  
  
     
Family History Problem Relation Age of Onset  Hypertension Mother    
 Sleep Apnea Mother    
 Hypertension Father    
  
     
Family Status Relation Name Status  Mother   Alive  Father   Alive  
  
  
Review of Systems:  Positive in BOLD 
  
CONST: Fever, weight loss, fatigue or chills GI: Nausea, vomiting, abdominal pain, change in bowel habits, hematochezia, melena, and GERD INTEG: Dermatitis, abnormal moles HEENT: Recent changes in vision, vertigo, epistaxis, dysphagia and hoarseness CV: Chest pain, palpitations, HTN, edema and varicosities RESP: Cough, shortness of breath, wheezing, hemoptysis, snoring and reactive airway disease : Hematuria, dysuria, frequency, urgency, nocturia and stress urinary incontinence MS: Weakness, joint pain - knees and arthritis ENDO: Diabetes, thyroid disease, polyuria, polydipsia, polyphagia, poor wound healing, heat intolerance, cold intolerance LYMPH/HEME: Anemia, bruising and history of blood transfusions NEURO: Dizziness, headache, fainting, seizures and stroke PSYCH: Anxiety and depression 
  
  
Physical Exam 
  
Visit Vitals /64 (BP 1 Location: Left arm, BP Patient Position: At rest) Pulse 69 Temp 98.1 °F (36.7 °C) Resp 16 Ht 5' 7\" (1.702 m) Wt 134.3 kg (296 lb) LMP 05/08/2019 SpO2 100% BMI 46.36 kg/m²  
 
  
General: 33 y.o.) female in no acute distress. Morbidly obese in hips, thighs, buttocks - gynecoid pattern HEENT: Normocephalic, atraumatic, Pupils equal and reactive, nasopharynx clear, oropharynx clear and moist without lesions NECK: Supple, no lymphadenopathy, thyromegaly, carotid bruits or jugular venous distension. trachea midline RESP: Clear to auscultation bilaterally, no wheezes, rhonchi, or rales, normal respiratory excursion CV: Regular rate and rhythm, no murmurs, rubs or gallops. 3+/4 pulses in bilateral dorsalis pedis and posterior tibialis. No distal edema or varicosities. ABD: Soft, nontender, nondistended, normoactive bowel sounds, no hernias, no hepatosplenomegaly, easily palpable costal margins, gynecoid distribution Extremities: Warm, well perfused, no tenderness or swelling, normal gait/station Neuro: Sensation and strength grossly intact and symmetrical 
Psych: Alert and oriented to person, place, and time. 
  
  
  
Studies: 
  
LABS: within normal limits except for hypovit D 
EKG: without significant abnormalities NUTRITIONAL EVALUATION has deemed the patient a good candidate for weight loss surgery PSYCHIATRIC EVALUATION has deemed the patient a good candidate for weight loss surgery PCP - cleared 
  
 Impression: 
  
Terence Desai is a 35 y.o. female who is suffering from morbid obesity and comorbidities including hypovit Dwho would benefit from bariatric surgery. We've discussed the restrictive and malabsorptive nature of the gastric bypass and compared and contrasted with the sleeve gastrectomy. The patient understands the likelihood of losing approximately 80% of their excess weight in 12 to 18 months. She also understands the risks including but not limited to bleeding, infection, need for reoperation, anastomotic ulcers, leaks and strictures, bowel obstruction secondary to adhesions and internal hernias, DVT, PE, heart attack, stroke, and death. Patient also understands risks of inadequate weight loss, excess weight loss, vitamin insufficiency, protein malnutrition, excess skin, and loss of hair. We have reviewed the components of a successful postoperative course including requirement for a high protein, low carbohydrate diet, 60 oz a day of zero calorie liquids, daily vitamin supplementation, daily exercise, regular follow-up, and participation in support groups. We have reviewed the preoperative liver shrinking clear liquid diet, as well as reviewed any medication changes required while on the clear liquid diet. In addition, the patient understands that all medications to be taken during the first 8 weeks postoperatively can be taken whole as long as the medication is approximately the size of a Amol 325 mg aspirin tablet in size. The patient further understands that it is his/her responsibility to review these and verify with their primary care doctor and pharmacist that all medications are of the appropriate size. We will schedule the patient for laparoscopic gastric bypass in the near future.

## 2019-05-08 NOTE — PROGRESS NOTES
Problem: Falls - Risk of 
Goal: *Absence of Falls Description Document Tia Manus Fall Risk and appropriate interventions in the flowsheet. Outcome: Progressing Towards Goal 
  
Problem: Patient Education: Go to Patient Education Activity Goal: Patient/Family Education Outcome: Progressing Towards Goal 
  
Problem: Pain Goal: *Control of Pain Outcome: Progressing Towards Goal 
  
Problem: Patient Education: Go to Patient Education Activity Goal: Patient/Family Education Outcome: Progressing Towards Goal

## 2019-05-08 NOTE — PERIOP NOTES
Pre-Op Summary Pt arrived via car with family/friend and is oriented to time, place, person and situation. Patient with steady gait with none assistive devices. Visit Vitals /64 (BP 1 Location: Left arm, BP Patient Position: At rest) Pulse 69 Temp 98.1 °F (36.7 °C) Resp 16 Ht 5' 7\" (1.702 m) Wt 134.3 kg (296 lb) LMP 05/08/2019 SpO2 100% BMI 46.36 kg/m² Peripheral IV located on Left hand . Patients belongings are located in closet. Patient's point of contact is [place] and their contact number is: [phone]. They will be leaving and coming back. They are able to receive medication information. They will be admitted.

## 2019-05-08 NOTE — ANESTHESIA POSTPROCEDURE EVALUATION
Procedure(s): LAPAROSCOPIC [id number] GASTRIC BYPASS. general 
 
Anesthesia Post Evaluation Multimodal analgesia: multimodal analgesia used between 6 hours prior to anesthesia start to PACU discharge Patient location during evaluation: bedside Patient participation: complete - patient participated Level of consciousness: awake Pain management: adequate Airway patency: patent Anesthetic complications: no 
Cardiovascular status: stable Respiratory status: acceptable Hydration status: acceptable Post anesthesia nausea and vomiting:  controlled Vitals Value Taken Time /69 5/8/2019 11:10 AM  
Temp 36.2 °C (97.2 °F) 5/8/2019  9:51 AM  
Pulse 63 5/8/2019 11:14 AM  
Resp 19 5/8/2019 11:14 AM  
SpO2 100 % 5/8/2019 11:14 AM  
Vitals shown include unvalidated device data.

## 2019-05-08 NOTE — ANESTHESIA PREPROCEDURE EVALUATION
Relevant Problems No relevant active problems Anesthetic History Review of Systems / Medical History Patient summary reviewed and pertinent labs reviewed Pulmonary Asthma : well controlled Neuro/Psych Within defined limits Cardiovascular Within defined limits Exercise tolerance: >4 METS 
  
GI/Hepatic/Renal 
Within defined limits Endo/Other Hypothyroidism Morbid obesity Other Findings Physical Exam 
 
Airway Mallampati: III 
TM Distance: 4 - 6 cm Neck ROM: normal range of motion Mouth opening: Diminished (comment) Cardiovascular Rhythm: regular Rate: normal 
 
 
 
 Dental 
No notable dental hx Pulmonary Breath sounds clear to auscultation Abdominal 
GI exam deferred Other Findings Anesthetic Plan ASA: 3 Anesthesia type: general 
 
 
 
 
Induction: Intravenous Anesthetic plan and risks discussed with: Patient

## 2019-05-08 NOTE — OP NOTES
DATE: 5/8/2019    PREOPERATIVE DIAGNOSIS: Clinically severe obesity, body mass index of 46,   comorbidities of hypovit D.   POSTOPERATIVE DIAGNOSIS: Clinically severe obesity, body mass index of 46,   comorbidities of hypovit D  PROCEDURES: Laparoscopic Adriana-en-Y gastric bypass with 316 cm retrocolic   retrogastric Adriana limb, 40 cm biliopancreatic limb, 15 ml    tubularized gastric pouch applied to the lesser curvature of stomach  SURGEON: Dr. Aissatou Conte. Krys Stovall MD, FACS   ASSISTANT: Ledy Melissa SA  ANESTHESIA: General endotracheal anesthesia. Local anesthetic mixture 1%   lidocaine, 0.5% Marcaine, with epinephrine injected locally utilizing 50  mL. FINDINGS: None  SPECIMEN: None  IMPLANTS: * No implants in log *  ESTIMATED BLOOD LOSS: 25 ml  FLUIDS: 2000 ml   URINE OUTPUT: 125 ml   DRAIN: None  COMPLICATIONS: None  OPERATIVE START TIME: 0759  OPERATIVE COMPLETION TIME: 0940    DESCRIPTION OF PROCEDURE: The patient was prepped and draped in standard sterile fashion after being placed under general anesthetic. A site was selected superior to the umbilicus in the midline. This area was incised. A 5  mm Optical trocar was placed over the laparoscope and directed into the abdominal cavity using Optiview technique. Abdomen was insufflated to 15 mmHg and surveyed. There was no evidence of injury from the entry. Additional trocars were then placed. My assistant place one 5 mm trocar was placed in the anterior axillary line left upper quadrant approximately 3 fingerbreadths below the costal margin and another 12 mm trocar was placed in the lateral portion of the left rectus sheath approximately 3 fingerbreadths lateral to the umbilical trocar. I then placed another 12 mm trocar was placed approximately 4 fingerbreadths lateral to the umbilical trocar in the  lateral portion of right rectus sheath. All were placed after incising with scalpel, all were placed using blunt dissecting technique.  There was no evidence of injury from the entries. Instrument were placed in the abdominal cavity. The omentum and transverse colon were retracted superiorly, exposing ligament of Treitz. The small bowel was measured 40 cm distal to the ligament of Treitz, divided at this level with A 60 mm [place] stapler. Harmonic dissection was used to continue the division to the base of the mesentery, confirming preservation of blood flow to the small bowel above and below the staple line prior to firing. Then, from the distal staple line, the Adriana limb was measured 100 cm distal and an antimesenteric enterotomy was made. Another antimesenteric enterotomy was made just proximal to the proximal staple line of the biliopancreatic limb. Through these enterotomies, a 60 mm [place] stapler was placed into the bowel, clamped on the antimesenteric borders and fired to form a stapled side-to-side anastomosis. The remaining enterotomy was closed in a running inverting fashion with 3-0 Vicryl suture. The mesenteric defect was then closed with running 2-0 silk suture, extending on the bowel wall in a running Lembert fashion for second layer closure of the enterotomy. At this point, attention was directed to the transverse mesocolon. While my assistant exposed the bare area of the transverse mesentery above the ligament of Trietz. A site was selected just anterior to the ligament of Treitz. This area was incised, entering the lesser sac. The Adriana limb was then passed through the fenestration of the transverse mesocolon and into the lesser sac taking care not to twist on its mesentery. At this point, the omentum and transverse colon were retracted inferiorly. The greater curvature of the stomach was then grasped and the gastrocolic ligament was retracted by my assistant and I then  dissected the ligament directly off of the wall of the stomach using the Harmonic scalpel to widely open the lesser sac.  Adhesions between the posterior wall of the stomach and the retroperitoneum were taken down under direct vision to fully free the lesser sac. At this point, an incision was made near the xiphoid. Through this incision, a Joshua retractor was placed through the abdominal wall beneath the left lateral segment of the liver and connected to a bedside retraction system allowing exposure of the esophageal hiatus. While my assistant retracted the stomach and lesser omentum inferiorly, I dissected the angle of His  anterior to posterior down the left christina of the diaphragm using Harmonic scalpel to assist in eventual division of the gastric pouch and distal stomach remnant. The lesser curvature of the stomach was then examined. A site was selected just proximal to the crow's foot of the vagus nerve in this area. The gastrohepatic ligament was dissected away from the lesser curvature of the stomach directly on the wall of the stomach to enter the lesser sac. This fenestration was then widened using Bovie cautery over a 10 mm articulating esophageal retractor which had been placed through the lesser sac and brought through the fenestration. Then, a 60 mm [place] stapler was placed transversely across the stomach through this fenestration, clamped and then fired to begin the formation of the gastric pouch. Another stapler was placed near the crotch of the previous staple line and directed superiorly toward the angle of His, and clamped. Prior to firing, a 34-[place] orogastric Lucille tube was brought through the esophagus into the stomach so its tip was against the transverse staple line, its course lying along the lesser curvature of stomach. The stapler was snugged against it and then fired.  Then, a 60 mm [place] stapler with Seam Guard reinforcement was placed in the crotch of the previous staple line and clamped and then fired from the crotch of previous staple line directed superiorly toward the angle of His until the gastric pouch was fully divided from the distal stomach remnant. Once fully divided, the staple lines were examined, noted to be hemostatic and viable. The lesser omentum was placed between the staple lines to prevent gastro-gastric fistulization. The tip of the gastric pouch placed in the lesser sac and the distal stomach remnant was retracted anteriorly to allow exposure of the lesser sac. The Adriana limb was examined. The proximal 8 cm were noted to be tethered by its mesentery. This proximal segment was elevated by my assistant and then I excised from the mesentery using a Harmonic scalpel and then one additional staple load was used to divide the devascularized segment from the remainder of the Adriana limb. This segment was brought out of the abdominal cavity via one of the trocar sites, passed off the field and discarded. The adriana limb mesentery was examined to confirm there was no twisting of the mesentery prior to sewing the anastomosis    At this point, the gastrojejunostomy was begun by sewing the right antimesenteric border of the Adriana limb to the distal portion of the gastric pouch using running suture of 3-0 Vicryl. An anterior gastrotomy and antimesenteric enterotomy were made. From the left apex of these 2 enterotomies, a 3-0 Vicryl suture was placed and run on the posterior surface in a running inverting fashion to the right apex, transitioned on the anterior surface and sewn approximately half way across the opening in an inverting fashion. Then, another 3-0 Vicryl suture was placed at the left apex and sewn to the previous suture in a running inverting fashion on the anterior surface. Prior to tying these sutures, the Lucille tube was brought across the anastomosis, then the sutures were tied, completing the inner layer. Then, from the left apex another 3-0 Vicryl suture was placed and run to the right apex in a running Lembert fashion completing the anterior outer layer os the anastomosis, thereby completing the anastomosis. Once this was complete, the Lucille tube was removed from the patient. Then, working from within the lesser sac, the Dwayne limb was sewn to the right anterior surface of the transverse mesocolic defect with  3 interrupted sutures of 2-0 silk. Then, the left side of the dwayne limb mesentery was closed to the left side of the tranverse mesocolic defect with a running suture of 2-0 silk while my assistant exposed the defects from within the lesser sac. The omentum and transverse colon were retracted superiorly. The base of the left side of the transverse mesocolic defect was exposed by my assistant and this was sewn to the base of the left side of the Dwayne limb mesentery with a pursestring suture of 2-0 silk. The Dwayne limb was then retracted to the left. The right-side of the  transverse mesocolic defect was exposed by my assistant and closed to the right side of the Dwayne limb mesentery with another pursestring suture of 2-0 silk, then one additional interrupted suture was placed between the right lateral surface of the Dwayne limb and the right lateral surface of the transverse mesocolic defect, completing the closure of the defect around the Dwayne limb. Once this was complete, both mesenteric defects were examined and noted to be well closed. Both anastomoses were examined and noted to be hemostatic and viable without evidence of leak. The omentum and transverse colon were retracted inferiorly back to normal position. The gastric remnant was placed over the anastomosis, returning the anastomosis into the lesser sac. The Joshua retractor was removed. At this point, the abdomen was desufflated via the remaining trocars. All trocars were then removed. The trocar sites were rendered hemostatic with Bovie cautery and then closed by my assistant with interrupted 4-0 Monocryl deep dermal sutures. Dermabond was applied as wound dressings. The patient tolerated the procedure well.

## 2019-05-08 NOTE — ROUTINE PROCESS
Patient arrived from pacu on hospital bed awake and alert. Lap sites to abdomen c/d/i  Patient ambulated to bathroom on arrival.   Patient on menses   Pad and mesh panties given.  at bedside very attentive. Ice pack provided to abdomen. Patient tolerating ice  chips.

## 2019-05-08 NOTE — PROGRESS NOTES
C/Enio Hamlin 1106, RN in room with pt.  
 
1506- Pt resting comfortably in bed. Denies SOB/CP/n/v. Still some abd discomfort from gas. Call bell w/i reach. 1919- Bedside and Verbal shift change report given to Vitaly Velásquez, OZ (oncoming nurse) by Puma Garcia RN (offgoing nurse). Report included the following information SBAR, Kardex and MAR.

## 2019-05-08 NOTE — PERIOP NOTES
7851 Pt received to PACU and connected to monitor. Bedside report given by Jett Brand RN. Vital signs stable. Nurse at bedside. Will continue to monitor. 0 Called to update pt's family on current status, no answer. 1124 TRANSFER - OUT REPORT: 
 
Verbal report given to Aditi Tolbert RN (name) on [place] Form  being transferred to Room 2202  for routine post - op Report consisted of patients Situation, Background, Assessment and  
Recommendations(SBAR). Information from the following report(s) SBAR, Kardex and MAR was reviewed with the receiving nurse. Lines:  
Peripheral IV 05/08/19 Left Hand (Active) Site Assessment Clean, dry, & intact 5/8/2019  9:51 AM  
Phlebitis Assessment 0 5/8/2019  9:51 AM  
Infiltration Assessment 0 5/8/2019  9:51 AM  
Dressing Status Clean, dry, & intact 5/8/2019  9:51 AM  
Dressing Type Transparent;Tape 5/8/2019  9:51 AM  
Hub Color/Line Status Blue; Infusing 5/8/2019  9:51 AM  
Action Taken Open ports on tubing capped 5/8/2019  9:51 AM  
Alcohol Cap Used Yes 5/8/2019  9:51 AM  
  
 
Opportunity for questions and clarification was provided. Patient transported with: 
 [facility]

## 2019-05-09 VITALS
WEIGHT: 293 LBS | HEIGHT: 67 IN | HEART RATE: 70 BPM | OXYGEN SATURATION: 99 % | TEMPERATURE: 98.1 F | SYSTOLIC BLOOD PRESSURE: 114 MMHG | RESPIRATION RATE: 18 BRPM | BODY MASS INDEX: 45.99 KG/M2 | DIASTOLIC BLOOD PRESSURE: 83 MMHG

## 2019-05-09 LAB
ANION GAP SERPL CALC-SCNC: 10 MMOL/L (ref 3–18)
BUN SERPL-MCNC: 7 MG/DL (ref 7–18)
BUN/CREAT SERPL: 8 (ref 12–20)
CALCIUM SERPL-MCNC: 8.9 MG/DL (ref 8.5–10.1)
CHLORIDE SERPL-SCNC: 106 MMOL/L (ref 100–108)
CO2 SERPL-SCNC: 23 MMOL/L (ref 21–32)
CREAT SERPL-MCNC: 0.87 MG/DL (ref 0.6–1.3)
ERYTHROCYTE [DISTWIDTH] IN BLOOD BY AUTOMATED COUNT: 13.1 % (ref 11.6–14.5)
GLUCOSE SERPL-MCNC: 70 MG/DL (ref 74–99)
HCT VFR BLD AUTO: 37.9 % (ref 35–45)
HGB BLD-MCNC: 12.7 G/DL (ref 12–16)
MCH RBC QN AUTO: 31 PG (ref 24–34)
MCHC RBC AUTO-ENTMCNC: 33.5 G/DL (ref 31–37)
MCV RBC AUTO: 92.4 FL (ref 74–97)
PLATELET # BLD AUTO: 253 K/UL (ref 135–420)
PMV BLD AUTO: 10.5 FL (ref 9.2–11.8)
POTASSIUM SERPL-SCNC: 4.2 MMOL/L (ref 3.5–5.5)
RBC # BLD AUTO: 4.1 M/UL (ref 4.2–5.3)
SODIUM SERPL-SCNC: 139 MMOL/L (ref 136–145)
WBC # BLD AUTO: 8.8 K/UL (ref 4.6–13.2)

## 2019-05-09 PROCEDURE — C9113 INJ PANTOPRAZOLE SODIUM, VIA: HCPCS | Performed by: SURGERY

## 2019-05-09 PROCEDURE — 80048 BASIC METABOLIC PNL TOTAL CA: CPT

## 2019-05-09 PROCEDURE — 74011250636 HC RX REV CODE- 250/636: Performed by: SURGERY

## 2019-05-09 PROCEDURE — 85027 COMPLETE CBC AUTOMATED: CPT

## 2019-05-09 PROCEDURE — 36415 COLL VENOUS BLD VENIPUNCTURE: CPT

## 2019-05-09 PROCEDURE — 74011000250 HC RX REV CODE- 250: Performed by: SURGERY

## 2019-05-09 PROCEDURE — 74011250637 HC RX REV CODE- 250/637: Performed by: SURGERY

## 2019-05-09 RX ORDER — OXYCODONE HYDROCHLORIDE 5 MG/1
5 TABLET ORAL
Qty: 20 TAB | Refills: 0 | Status: SHIPPED | OUTPATIENT
Start: 2019-05-09 | End: 2019-05-12

## 2019-05-09 RX ORDER — ENOXAPARIN SODIUM 100 MG/ML
40 INJECTION SUBCUTANEOUS DAILY
Qty: 7 SYRINGE | Refills: 0 | Status: SHIPPED
Start: 2019-05-09 | End: 2019-05-24 | Stop reason: ALTCHOICE

## 2019-05-09 RX ORDER — ONDANSETRON 4 MG/1
4 TABLET, FILM COATED ORAL
Qty: 10 TAB | Refills: 0 | Status: SHIPPED | OUTPATIENT
Start: 2019-05-09 | End: 2019-05-24

## 2019-05-09 RX ADMIN — ENOXAPARIN SODIUM 40 MG: 40 INJECTION SUBCUTANEOUS at 09:30

## 2019-05-09 RX ADMIN — KETOROLAC TROMETHAMINE 15 MG: 30 INJECTION, SOLUTION INTRAMUSCULAR at 00:19

## 2019-05-09 RX ADMIN — ACETAMINOPHEN 650 MG: 325 TABLET, FILM COATED ORAL at 13:32

## 2019-05-09 RX ADMIN — ACETAMINOPHEN 650 MG: 325 TABLET, FILM COATED ORAL at 09:28

## 2019-05-09 RX ADMIN — SODIUM CHLORIDE, SODIUM LACTATE, POTASSIUM CHLORIDE, AND CALCIUM CHLORIDE 150 ML/HR: 600; 310; 30; 20 INJECTION, SOLUTION INTRAVENOUS at 00:22

## 2019-05-09 RX ADMIN — KETOROLAC TROMETHAMINE 15 MG: 30 INJECTION, SOLUTION INTRAMUSCULAR at 05:34

## 2019-05-09 RX ADMIN — PANTOPRAZOLE SODIUM 40 MG: 40 INJECTION, POWDER, FOR SOLUTION INTRAVENOUS at 09:30

## 2019-05-09 RX ADMIN — ACETAMINOPHEN 650 MG: 325 TABLET, FILM COATED ORAL at 02:41

## 2019-05-09 RX ADMIN — SODIUM CHLORIDE, SODIUM LACTATE, POTASSIUM CHLORIDE, AND CALCIUM CHLORIDE 150 ML/HR: 600; 310; 30; 20 INJECTION, SOLUTION INTRAVENOUS at 06:44

## 2019-05-09 NOTE — PROGRESS NOTES
Surgery Progress Note 5/9/2019 Admit Date: 5/8/2019 Subjective:  
 
Patient has complaints of none Pain is controlled with PO Tylenol, Toradol, and PO Marta. Patient has been ambulating in halls. She reports no nausea and no vomiting. Bowel Movements: None and Flatus Pt sitting up in bed, pleasant, and on phone this morning. Objective:  
 
Blood pressure 114/83, pulse 70, temperature 98.1 °F (36.7 °C), resp. rate 18, height 5' 7\" (1.702 m), weight 134.3 kg (296 lb), last menstrual period 05/08/2019, SpO2 99 %, unknown if currently breastfeeding. No intake/output data recorded. 05/07 1901 - 05/09 0700 In: 4187.5 [P.O.:270; I.V.:3917.5] Out: 2000 [[id number]] EXAM: GENERAL: alert, pleasant, no distress HEART: regular rate and rhythm LUNGS: clear to auscultation ABDOMEN:  Soft, obese,  appropriately tender at surgical sites, non distended, all  incisions clean, dry, no erythema or drainage EXTREMITIES: warm, well perfused Data Review Recent Results (from the past 24 hour(s)) CBC W/O DIFF Collection Time: 05/09/19  4:20 AM  
Result Value Ref Range WBC 8.8 4.6 - 13.2 K/uL  
 RBC 4.10 (L) 4.20 - 5.30 M/uL  
 HGB 12.7 12.0 - 16.0 g/dL HCT 37.9 35.0 - 45.0 % MCV 92.4 74.0 - 97.0 FL  
 MCH 31.0 24.0 - 34.0 PG  
 MCHC 33.5 31.0 - 37.0 g/dL  
 RDW 13.1 11.6 - 14.5 % PLATELET 201 841 - 625 K/uL MPV 10.5 9.2 - 20.4 FL  
METABOLIC PANEL, BASIC Collection Time: 05/09/19  4:20 AM  
Result Value Ref Range Sodium 139 136 - 145 mmol/L Potassium 4.2 3.5 - 5.5 mmol/L Chloride 106 100 - 108 mmol/L  
 CO2 23 21 - 32 mmol/L Anion gap 10 3.0 - 18 mmol/L Glucose 70 (L) 74 - 99 mg/dL BUN 7 7.0 - 18 MG/DL Creatinine 0.87 0.6 - 1.3 MG/DL  
 BUN/Creatinine ratio 8 (L) 12 - 20 GFR est AA >60 >60 ml/min/1.73m2 GFR est non-AA >60 >60 ml/min/1.73m2 Calcium 8.9 8.5 - 10.1 MG/DL Assessment: Darshan Edwards is a 35 y.o. female,  day 1 status post Laparoscopic Adriana-en-Y gastric bypass with 098 cm retrocolic  
retrogastric Adriana limb, 40 cm biliopancreatic limb, 15 ml   
tubularized gastric pouch applied to the lesser curvature of stomach. Condition: good and stable, VSS Plan:  
-Ambulate every four hours - Pain managed with PO Tylenol scheduled, PO Roxicodone prn prior to d/c  
-Nausea managed prior to d/c  
-Advance to Clear liquid Gastric Bypass Diet, if able to tolerate clear liquid diet (with pro shake) 4oz per hour for 3 hours prior to d/c If patient continue to progress d/c home later today Kim Pugh NP 
8:34 AM 
5/9/2019

## 2019-05-09 NOTE — PROGRESS NOTES
Awake with no complaints. She reports walking 5 times during the night shift. She is using her I.S. Patient was educated on progression of diet this AM. Goal of 4 ounces per hour with one ounce being protein was clearly understood. Patient was instructed to go slow with small sips to reach goal. Patient given a report card to record intake. Education completed on I.S use and to ambulate in [place] at least 4 times. Will follow up and check progression.

## 2019-05-09 NOTE — PROGRESS NOTES
Goals met, reviewed all discharge instructions below and patient was discharged. Hydration Hydration is your NUMBER ONE priority. Dehydration is the most common reason for readmission to the hospital. Dehydration occurs when 
your body does not get enough fluid to keep it functioning at its best. Your body also requires fluid 
to burn its stored fat calories for energy. Carry a bottle of water with you all day, even when you are away from home; remind yourself to 
drink even if you dont feel thirsty. Drinking 64 ounces of fluid is your daily goal. You can tell if 
youre getting enough fluid if youre making clear, light-colored urine five to 10 times per day. Signs of dehydration can be thirst, headache, hard stools or dizziness upon sitting or standing up. You should contact your surgeons office if you are unable to drink enough fluid to stay hydrated.  [address] [facility] General Care after Surgery  No lifting over 15 pounds for four weeks.  No driving while taking the pain medication (about seven to 10 days).  No tub baths, swimming or hot tubs until incisions are healed (about two weeks).  You may shower. Clean incisions daily /gently with soap and check incisions for signs of infection:  Redness around incision.  Swelling at site.  Drainage with an foul odor (pus).  Increase tenderness around incision.  Take your temperature and resting pulse in the morning and evening. Record on tracking form 
given to you. Call if your temperature is greater than 101 or your pulse rate is greater than 115.  Please contact your surgeon if you are having excessive abdominal pain (that lasts longer than 
four hours and does not improve with prescribed pain medication), vomiting or shortness of breath.  Get up and move  do not sit in one place for more than an hour.  You need to WALK (EXERCISE) for 30 minutes per day.  Walking around your house does not count.  Bike, treadmill and elliptical are OK.  NO weight lifting or sit ups.  If constipated take an adult dose of Miralax (available over the counter). Contact the doctors 
office if Miralax doesnt help.  You may swallow pills starting the day after surgery as long as they fit inside this [place]:  Continue to use your incentive spirometer (breathing machine) for the next couple of weeks to 
help prevent pneumonia. [address] [facility] Temperature/Heart Rate Log Take your temperature and heart rate (pulse) twice a day for 14 days. Take both in the morning and 
evening at about the same time each day (when you wake up and before you go to bed when you 
are relaxed). Please contact your doctors office if your:  Temperature is higher than 101 degrees.  Heart rate (pulse) is higher than 115 beats per minute (normal heart rate is 60 to 100 beats per minute). How to Take Your Heart Rate (Pulse)  Turn your left hand so that your palm is face-up.  With the index and middle fingers of your right 
hand, draw a line from the base of your thumb to 
just below the crease in your wrist. Your fingers 
should nestle just to the left of the large tendon that 
pops up when you bend your wrist toward you.  Dont press too hard, that will make the pulse go 
away. Use gentle pressure.  Wait. It can take several seconds  and several micro-adjustments in the placement of your two 
fingers on your wrist  to find your pulse. Just keep moving your fingers down or up your wrist 
in small increments (and pausing for a few seconds) until you find it. How to Take Your Pulse Rate [facility] Find a watch with a second hand and place it on your right wrist or on the table next 
to your left hand.  After finding your pulse, count the number of beats for 20 seconds.  Multiply by three to get your heart rate, or beats per minute (or just count for 60 seconds for a math-free option).  Normal, resting heart rate is about 60 to 100 beats per minute.  46  PATIENT GUIDE TO BARIATRIC SURGERY [address] [place] [facility] Lovenox Self Injection Guide Prepare Step 1: Wash and dry your hands thoroughly. Step 2: Sit or lie in a comfortable position and choose an area 
on the right or left side of the abdomen at least two inches away 
from the belly button. Step 3: Clean the injection site with an alcohol swab and let dry. Inject Step 4: Remove the needle cap by pulling it straight off the syringe and 
discard it in a sharps . Step 5: With your other hand, pinch an inch of the cleansed area to 
make a fold in the skin. Insert the full length of the needle straight 
down  at a 90? angle  into the fold of skin.  48  PATIENT GUIDE TO BARIATRIC SURGERY [address] [place] [facility] Step 6: Press the plunger with your thumb until the syringe is empty. Then pull the needle straight out and release the skin fold. Dispose Step 7: Point the needle down and away from yourself and others, 
and then push down on the plunger to activate the safety shield. Step 8: Place the used syringe in the sharps . Do NOT expel the air bubble from the syringe before the injection. Administration should be alternated between the left and right abdominal wall. The whole length 
of the needle should be introduced into a skin fold held between the thumb and forefinger; the 
skin fold should be held throughout the injection. To minimize bruising, do not rub the injection 
site after completion of the injection. Questions about LOVENOX? Call [phone]  49  PATIENT GUIDE TO BARIATRIC SURGERY [address] [place] [facility] 9. DIET AND LIFESTYLE Key Diet Principles Following Bariatric Surgery  Begin each meal with soft moist high protein foods (i.e. chicken, turkey, yogurt, tuna, eggs, 
cottage cheese, other fish and seafood).  Consume a minimum of 64 ounces of fluid each day to prevent dehydration. No straws.  No food and fluid together. Stop drinking 30 minutes before a meal. You may begin fluids again 30 minutes after you finish a meal. 
 Eat very slowly and chew all foods completely.  Keep portions small.  No simple sugars or high fat foods. No carbonated beverages. No caffeine.  Eat three meals per day. No skipping. Avoid snacking between meals.  No alcohol. No smoking.  Two Flintstones Complete Chewable vitamins each day. Take one in the morning and one at night.  1,500 milligrams Calcium Citrate per day in separate dosages.  Vitamin D 3: 5,000 IU taken per day.  Vitamin B-12: Take 1,000 micrograms sublingually daily.  Iron: 60 milligrams per day from [facility] [employer].  Protein supplements of your choice. Must be low sugar (0 to 3 grams), low fat (0 to 3 grams) and 
provide at least 35 to 40 grams of protein each day. You need 60 to 70 grams of protein (food 
and supplements) each day.  Minimum of 30 minutes of physical activity daily. Do not noe NSAIDS . Do not take Steroids without your surgeons permission.  50  PATIENT GUIDE TO BARIATRIC SURGERY [address] [facility] Your Priorities After Surgery ? Fluid: 64 ounces of fluid per day. ? Protein: 60 to 70 grams of protein per day. ? Walk every day. Clear Liquid Diet One week of clear liquids: minimum of 64 ounces of fluid per day. Fluid:  Zero calorie liquids.  No caffeine.  No carbonation.  No sugary drinks.  No alcohol.  No straws. Food  Protein drinks.  Less than 3 grams of sugar and 
3 grams of fat per serving.  Protein drink should provide you with 
60 to 70 grams of protein. Soft Protein Diet Five weeks of soft protein (1 ounce for soft protein, 3 ounces of yogurt/cottage cheese). Three meals per day and 1 protein shake.  Protein shakes should provide you with 30 grams of 
 protein on the soft protein diet. Slow transition:  First week on soft protein diet  focus on yogurt, cottage cheese, eggs, vegetarian refried beans, 
black beans, kidney beans and white beans. (NO BAKED BEANS.)  Second through fourth week on soft protein diet  focus on yogurt, cottage cheese, eggs, 
canned tuna, canned chicken, tilapia and fish (needs to be soft enough to be cut up with a fork).  Fifth week on soft protein diet  focus on yogurt, cottage cheese, eggs, canned tuna, canned 
chicken, tilapia, fish, salmon, chicken breast or turkey. Fluid is your #1 Priority! Continue clear liquids between meals. You will need 64 ounces of fluid per day. Fluids that you can have include:  Water.  Zero calorie liquids. You will need to sip throughout the day and should therefore have a water bottle with you at all 
times! No liquids with your meals. Stop 30 minutes before a meal and wait 30 minutes after a meal. 
No straws. Zero calorie liquids. No caffeine. No carbonation. No sugary drinks. No alcohol.  51  PATIENT GUIDE TO BARIATRIC SURGERY [address] [place] [employer] You will need 60 to 70 grams of protein per day.  60 to 70 grams of protein shakes when on the clear liquid diet (two to three shakes per day).  30 to 50 grams of protein shakes when on the soft protein diet (one shake per day). Eat Three Times Per Day You will need to eat three times per day. My planned times are: 
_________________________________________________________ 
_________________________________________________________ 
_________________________________________________________ Nausea, Vomiting, Stomach Pain If you have problems with nausea, vomiting or stomach pain, try: 
 Eating slowly: 20 to 30 minutes per meal. 
 Chewing food thoroughly: 20 to 30 chews before food is swallowed.  Small portions: measure portions in medicine cup.  Stopping before feeling full.  AVOIDING SUGAR and FRIED FOOD: sugar will cause dumping syndrome and lead to weight gain. Exercise I will need to get a minimum of 30 minutes of exercise per day or 150 minutes of exercise per week.  Walking, swimming, biking or elliptical. 
 Find something you enjoy! Vitamins After surgery, you will need to take the following vitamins for the rest of your life  FOREVER.  Vitamin D 3: 5,000 IU per day.  Calcium Citrate: 1,500 milligrams, taken separately.  Flintstones Complete: two per day, taken separately.  Sublingual Vitamin B-12: 1,000 micrograms daily.  Iron for menstruating women or patients with a history of low iron: 65 milligrams daily. We recommend going to www[facility]. Polybiotics and purchasing iron from there. The lemon-lime has 60 milligrams. This iron is better absorbed than over-the-counter iron.  52  PATIENT GUIDE TO BARIATRIC SURGERY [address] [place] [employer] Clear Liquid Log Getting your fluid in is top priority during this week. Fluids (MINIUM of 64 ounces per day): 
? 8 oz. ? 8 oz. ? 8 oz. ? 8 oz. ? 8 oz. ? 8 oz. ? 8 oz. ? 8 oz. ? 8 oz. ? 8 oz. ? 8 oz. ? 8 oz. Flintstones Complete Chewable: ? a.m. ? p.m. Calcium Citrate (1,500 milligrams/day): 
Pill form ? Two crushed pills (morning) ? Two crushed pills (afternoon) ? Two crushed pills (evening) OR Upcal D (powder) ? One pack/scoop ? One pack/scoop ? One pack/scoop OR Celebrate Chewable Vitamins (500 mg each) or B[facility] [employer]age Chewables (500 mg) ? One chewable (morning) ? One chewable (afternoon) ? One chewable (evening) OR Liquid Calcium Citrate ? 1 tbsp. Calcium Citrate ? 1 tbsp. Calcium Citrate ? 1 tbsp. Calcium Citrate Vitamin D3: ? 5,000 IU daily. Vitamin B-12: ? 1,000 micrograms per day. Iron (menstruating women or patients with a history of low iron): 
? 60 milligrams per day from [facility] [employer].  
Protein drinks (protein drinks should be under 3 grams of sugar and 3 grams of fat). Protein shake (60 grams per day): ? a.m. ? p.m. Exercise: ? 30 to 40 minutes per day.  53  PATIENT GUIDE TO BARIATRIC SURGERY [address] [place] Bariatric Soft and Moist Diet Shopping List 
 alcium Citrate (1,500 milligrams/day): 
Pill form ? Two crushed pills (morning) ? Two crushed pills (afternoon) ? Two crushed pills (evening) OR Upcal D (powder) ? One pack/scoop ? One pack/scoop ? One pack/scoop OR Celebrate Chewable Vitamins (500 mg each) or [facility] [employer]age Chewables (500 mg) ? One chewable (morning) ? One chewable (afternoon) ? One chewable (evening) OR Liquid Calcium Citrate ? 1 tbsp. Calcium Citrate ? 1 tbsp. Calcium Citrate ? 1 tbsp. Calcium Citrate Vitamin D3: ? 5,000 IU daily Vitamin B-12: ? 1,000 micrograms per day Iron (menstruating women or 
patients with a history of low iron): 
? 60 milligrams per day 
from [facility] [employer] Protein drinks (protein drinks should be under 3 grams of sugar and 3 grams of fat). Protein shake (30 to 40 grams per day): 
? a.m. ? p.m. Exercise: ? 30 to 40 minutes per Educated on Diet Progression [place] Gastric Bypass and Sleeve Dietary Progression Patient Name:  
Date of Surgery: Ice Chips start once admitted on floor. Begin Bariatric Clear Liquid Diet on: May 9 Clear Liquid Diet: 64 oz. of fluid per day 
o Low calorie, low sugar, non-carbonated beverages ? Water, Crystal Light, Propel Water, Sugar Free Jell-O, Sugar Free Popsicles, Bouillon 
? Start protein supplement during this stage. (60-70 grams per day) ? Getting your fluid in and staying hydrated is your #1 priority! ? The clear liquid diet will last for 7 days. Begin Bariatric Soft and Moist on: May 16th ? This stage of the diet will last for 5 weeks, unless otherwise instructed by your surgeon. ? Begin:  1 week post-op ? End:  6 weeks post-op (or when you follow up with the Registered Dietitian) ? Soft, moist, high protein foods: 3 meals per day plus protein supplements. o   Portions should emphasize on soft protein. o Portions will be a MAXIMUM of: 
o  1 ounce of solid food 
o  2-3 ounces of cottage cheese and yogurt. o Protein supplements should be between meals and provide 30-40 grams per day during soft protein diet. o Continue to get 64 ounces of fluid in per day. ? Protein foods that are ok on the Soft and Moist Diet include: 
o Slow transition: 
o 1st week on soft protein should focus on: Yogurt, cottage cheese, eggs 
o 2nd -4th  week on soft protein diet should focus on: yogurt, cottage cheese, eggs, canned tuna, canned chicken, tilapia, fish (needs to be soft enough to be cut up with a fork) o 5th week on soft protein diet should focus on: Yogurt, cottage cheese, eggs, canned tuna, canned chicken, tilapia, fish, salmon, chicken breast, or turkey. Remember to continue to get 64 ounces of fluid daily on ALL Stages. To be advanced to Bariatric Maintenance Stage of the bariatric diet, follow up with the dietitian 6 weeks post-op, around:    
 
 
For any additional questions, please refer to your blue binder that was provided to you at the start of the bariatric program.

## 2019-05-09 NOTE — PROGRESS NOTES
Problem: Discharge Planning Goal: *Discharge to safe environment Outcome: Resolved/Met Plan home Reason for Admission:   Gastric bypass RRAT Score:     3 Plan for utilizing home health:     no  
                 
Current Advanced Directive/Advance Care Plan: none Likelihood of Readmission:  low Transition of Care Plan:   Spoke with pt, lives with spouse. Independent with adls and amb. No dme. Demographics correct. pcp Dr Nuris Starr Plan home, no needs anticipated. Patient has designated ____spouse____________________ to participate in his/her discharge plan and to receive any needed information. Name: Rivera Rodríguez Address: 
Phone number: [phone] Care Management Interventions PCP Verified by CM: Yes 
Palliative Care Criteria Met (RRAT>21 & CHF Dx)?: No 
Mode of Transport at Discharge: Other (see comment) Transition of Care Consult (CM Consult): Discharge Planning Discharge Durable Medical Equipment: No 
Physical Therapy Consult: No 
Occupational Therapy Consult: No 
Speech Therapy Consult: No 
Current Support Network: Lives with Spouse Confirm Follow Up Transport: Family Plan discussed with Pt/Family/Caregiver: Yes Discharge Location Discharge Placement: Home

## 2019-05-09 NOTE — PROGRESS NOTES
Problem: Falls - Risk of 
Goal: *Absence of Falls Description Document Efraín Tran Fall Risk and appropriate interventions in the flowsheet. Outcome: Progressing Towards Goal 
Note:  
Fall Risk Interventions: 
Mobility Interventions: Patient to call before getting OOB Mentation Interventions: Adequate sleep, hydration, pain control, Door open when patient unattended Medication Interventions: Teach patient to arise slowly, Patient to call before getting OOB, Evaluate medications/consider consulting pharmacy Elimination Interventions: Call light in reach, Patient to call for help with toileting needs History of Falls Interventions: Door open when patient unattended Problem: Patient Education: Go to Patient Education Activity Goal: Patient/Family Education Outcome: Progressing Towards Goal 
  
Problem: Pain Goal: *Control of Pain Outcome: Progressing Towards Goal 
  
Problem: Patient Education: Go to Patient Education Activity Goal: Patient/Family Education Outcome: Progressing Towards Goal 
  
Problem: Laparoscopic Gastric Bypass:Post-Op Day 1 Goal: Activity/Safety Outcome: Progressing Towards Goal 
Goal: Diagnostic Test/Procedures Outcome: Progressing Towards Goal 
Goal: Medications Outcome: Progressing Towards Goal 
Goal: Respiratory Outcome: Progressing Towards Goal 
Goal: *No signs and symptoms of infection or wound complications Outcome: Progressing Towards Goal 
Goal: *Optimal pain control at patient's stated goal 
Outcome: Progressing Towards Goal 
Goal: *Adequate urinary output (equal to or greater than 30 milliliters/hour) Outcome: Progressing Towards Goal 
Goal: *Absence of signs and symptoms of DVT Outcome: Progressing Towards Goal 
Goal: *Labs within defined limits Outcome: Progressing Towards Goal 
Goal: *Oxygen saturation within defined limits Outcome: Progressing Towards Goal 
  
Problem: Laparoscopic Gastric Bypass:Discharge Outcomes Goal: *Lungs clear or at baseline Outcome: Progressing Towards Goal 
Goal: *Optimal pain control at patient's stated goal 
Outcome: Progressing Towards Goal

## 2019-05-09 NOTE — DISCHARGE INSTRUCTIONS
If any questions refer to the blue binder. Team  Phone no. Are in  Your blue binder  Hydration  Hydration is your NUMBER ONE priority. Dehydration is the most common reason for readmission to the hospital. Dehydration occurs when  your body does not get enough fluid to keep it functioning at its best. Your body also requires fluid  to burn its stored fat calories for energy. Carry a bottle of water with you all day, even when you are away from home; remind yourself to  drink even if you dont feel thirsty. Drinking 64 ounces of fluid is your daily goal. You can tell if  youre getting enough fluid if youre making clear, light-colored urine five to 10 times per day. Signs of dehydration can be thirst, headache, hard stools or dizziness upon sitting or standing up. You should contact your surgeons office if you are unable to drink enough fluid to stay hydrated. --   [address] after Surgery   No lifting over 15 pounds for four weeks.  No driving while taking the pain medication (about seven to 10 days).  No tub baths, swimming or hot tubs until incisions are healed (about two weeks).  You may shower. Clean incisions daily /gently with soap and check incisions for signs of infection:  -- Redness around incision. -- Swelling at site. -- Drainage with an foul odor (pus). -- Increase tenderness around incision.  Take your temperature and resting pulse in the morning and evening. Record on tracking form  given to you. Call if your temperature is greater than 101 or your pulse rate is greater than 115.  Please contact your surgeon if you are having excessive abdominal pain (that lasts longer than  four hours and does not improve with prescribed pain medication), vomiting or shortness of breath.  Get up and move -- do not sit in one place for more than an hour.  You need to WALK (EXERCISE) for 30 minutes per day. -- Walking around your house does not count.   -- Bike, treadmill and elliptical are OK. -- NO weight lifting or sit ups.  If constipated take an adult dose of Miralax (available over the counter). Contact the doctors  office if Miralax doesnt help.  You may swallow pills starting the day after surgery as long as they fit inside this [place]:   Continue to use your incentive spirometer (breathing machine) for the next couple of weeks to  help prevent pneumonia. [address]  Temperature/Heart Rate Log  Take your temperature and heart rate (pulse) twice a day for 14 days. Take both in the morning and  evening at about the same time each day (when you wake up and before you go to bed when you  are relaxed). Please contact your doctors office if your:   Temperature is higher than 101 degrees.  Heart rate (pulse) is higher than 115 beats per minute  (normal heart rate is 60 to 100 beats per minute). How to Take Your Heart Rate (Pulse)   Turn your left hand so that your palm is face-up.  With the index and middle fingers of your right  hand, draw a line from the base of your thumb to  just below the crease in your wrist. Your fingers  should nestle just to the left of the large tendon that  pops up when you bend your wrist toward you.  Dont press too hard, that will make the pulse go  away. Use gentle pressure.  Wait. It can take several seconds -- and several micro-adjustments in the placement of your two  fingers on your wrist -- to find your pulse. Just keep moving your fingers down or up your wrist  in small increments (and pausing for a few seconds) until you find it. How to Take Your Pulse Rate   Find a watch with a second hand and place it on your right wrist or on the table next  to your left hand.  After finding your pulse, count the number of beats for 20 seconds.  Multiply by three to get your heart rate, or beats per minute  (or just count for 60 seconds for a math-free option).    Normal, resting heart rate is about 60 to 100 beats per minute. -- 55 --  PATIENT GUIDE TO [address]  Lovenox Self Injection Guide  Prepare  Step 1: Wash and dry your hands thoroughly. Step 2: Sit or lie in a comfortable position and choose an area  on the right or left side of the abdomen at least two inches away  from the belly button. Step 3: Clean the injection site with an alcohol swab and let dry. Inject  Step 4: Remove the needle cap by pulling it straight off the syringe and  discard it in a sharps . Step 5: With your other hand, pinch an inch of the cleansed area to  make a fold in the skin. Insert the full length of the needle straight  down -- at a 90? angle -- into the fold of skin. -- 50 --  PATIENT GUIDE TO [address]  Step 6: Press the plunger with your thumb until the syringe is empty. Then pull the needle straight out and release the skin fold. Dispose  Step 7: Point the needle down and away from yourself and others,  and then push down on the plunger to activate the safety shield. Step 8: Place the used syringe in the sharps . Do NOT expel the air bubble from the syringe before the injection. Administration should be alternated between the left and right abdominal wall. The whole length  of the needle should be introduced into a skin fold held between the thumb and forefinger; the  skin fold should be held throughout the injection. To minimize bruising, do not rub the injection  site after completion of the injection. Questions about LOVENOX? Call [phone]  -- 49 --  PATIENT GUIDE TO [address] [place] [address]. DIET AND LIFESTYLE  Key Diet Principles Following Bariatric Surgery   Begin each meal with soft moist high protein foods (i.e. chicken, turkey, yogurt, tuna, eggs,  cottage cheese, other fish and seafood).    Consume a minimum of 64 ounces of fluid each day to prevent dehydration. No straws.  No food and fluid together. Stop drinking 30 minutes before a meal. You may begin fluids again  30 minutes after you finish a meal.   Eat very slowly and chew all foods completely.  Keep portions small.  No simple sugars or high fat foods. No carbonated beverages. No caffeine.  Eat three meals per day. No skipping. Avoid snacking between meals.  No alcohol. No smoking.  Two Flintstones Complete Chewable vitamins each day. Take one in the morning and one at night.  1,500 milligrams Calcium Citrate per day in separate dosages.  Vitamin D 3: 5,000 IU taken per day.  Vitamin B-12: Take 1,000 micrograms sublingually daily.  Iron: 60 milligrams per day from [facility].  Protein supplements of your choice. Must be low sugar (0 to 3 grams), low fat (0 to 3 grams) and  provide at least 35 to 40 grams of protein each day. You need 60 to 70 grams of protein (food  and supplements) each day.  Minimum of 30 minutes of physical activity daily. Do not noe NSAIDS . Do not take Steroids without your surgeons permission. -- 48 --  [address] [facility]  Your Priorities After Surgery  ? Fluid: 64 ounces of fluid per day. ? Protein: 60 to 70 grams of protein per day. ? Walk every day. Clear Liquid Diet  One week of clear liquids: minimum of 64 ounces of fluid per day. Fluid:   Zero calorie liquids.  No caffeine.  No carbonation.  No sugary drinks.  No alcohol.  No straws. Food   Protein drinks.  Less than 3 grams of sugar and  3 grams of fat per serving.  Protein drink should provide you with  60 to 70 grams of protein. Soft Protein Diet  Five weeks of soft protein (1 ounce for soft protein, 3 ounces of yogurt/cottage cheese). Three meals per day and 1 protein shake. Protein shakes should provide you with 30 grams of  protein on the soft protein diet.   Slow transition:   First week on soft protein diet -- focus on yogurt, cottage cheese, eggs, vegetarian refried beans,  black beans, kidney beans and white beans. (NO BAKED BEANS.)   Second through fourth week on soft protein diet -- focus on yogurt, cottage cheese, eggs,  canned tuna, canned chicken, tilapia and fish (needs to be soft enough to be cut up with a fork).  Fifth week on soft protein diet -- focus on yogurt, cottage cheese, eggs, canned tuna, canned  chicken, tilapia, fish, salmon, chicken breast or turkey. Fluid is your #1 Priority! Continue clear liquids between meals. You will need 64 ounces of fluid per day. Fluids that you can have include:   Water.  Zero calorie liquids. You will need to sip throughout the day and should therefore have a water bottle with you at all  times! No liquids with your meals. Stop 30 minutes before a meal and wait 30 minutes after a meal.  No straws. Zero calorie liquids. No caffeine. No carbonation. No sugary drinks. No alcohol. -- 46 --  [address] [facility]  Protein  You will need 60 to 70 grams of protein per day.  60 to 70 grams of protein shakes when on the clear liquid diet (two to three shakes per day).  30 to 50 grams of protein shakes when on the soft protein diet (one shake per day). Eat Three Times Per Day  You will need to eat three times per day. My planned times are:  _________________________________________________________  _________________________________________________________  _________________________________________________________  Nausea, Vomiting, Stomach Pain  If you have problems with nausea, vomiting or stomach pain, try:   Eating slowly: 20 to 30 minutes per meal.   Chewing food thoroughly: 20 to 30 chews before food is swallowed.  Small portions: measure portions in medicine cup.  Stopping before feeling full.  AVOIDING SUGAR and FRIED FOOD: sugar will cause dumping syndrome and lead to weight gain.   Exercise  I will need to get a minimum of 30 minutes of exercise per day or 150 minutes of exercise per week.  Walking, swimming, biking or elliptical.   Find something you enjoy! Vitamins  After surgery, you will need to take the following vitamins for the rest of your life -- FOREVER.  Vitamin D 3: 5,000 IU per day.  Calcium Citrate: 1,500 milligrams, taken separately.  Flintstones Complete: two per day, taken separately.  Sublingual Vitamin B-12: 1,000 micrograms daily.  Iron for menstruating women or patients with a history of low iron: 65 milligrams daily. We recommend going to [email or web address] com and purchasing iron from there. The lemon-lime has 60 milligrams. This iron is better absorbed than over-the-counter iron. -- 46 --  PATIENT GUIDE TO BARIATRIC [address] [place] [address]  Clear Liquid Log  Getting your fluid in is top priority during this week. Fluids (MINIUM of 64 ounces per day):  ? 8 oz. ? 8 oz. ? 8 oz. ? 8 oz. ? 8 oz. ? 8 oz. ? 8 oz. ? 8 oz. ? 8 oz. ? 8 oz. ? 8 oz. ? 8 oz. Flintstones Complete Chewable: ? a.m. ? p.m. Calcium Citrate (1,500 milligrams/day):  Pill form  ? Two crushed pills (morning) ? Two crushed pills (afternoon) ? Two crushed pills (evening)  OR Upcal D (powder)  ? One pack/scoop ? One pack/scoop ? One pack/scoop  OR Celebrate Chewable Vitamins (500 mg each) or Bariatric Advantage Chewables (500 mg)  ? One chewable (morning) ? One chewable (afternoon) ? One chewable (evening)  OR Liquid Calcium Citrate  ? 1 tbsp. Calcium Citrate ? 1 tbsp. Calcium Citrate ? 1 tbsp. Calcium Citrate  Vitamin D3: ? 5,000 IU daily. Vitamin B-12: ? 1,000 micrograms per day. Iron (menstruating women or patients with a history of low iron):  ? 60 milligrams per day from [facility] [employer]. Protein drinks (protein drinks should be under 3 grams of sugar and 3 grams of fat). Protein shake (60 grams per day): ? a.m. ? p.m. Exercise: ? 30 to 40 minutes per day.   -- 53 --  PATIENT GUIDE TO BARIATRIC [address] [facility]  Bariatric Soft and Moist Diet Shopping List   alcium Citrate (1,500 milligrams/day):  Pill form  ? Two crushed pills (morning) ? Two crushed pills (afternoon) ? Two crushed pills (evening)  OR Upcal D (powder)  ? One pack/scoop ? One pack/scoop ? One pack/scoop  OR Celebrate Chewable Vitamins (500 mg each) or Bariatric Advantage Chewables (500 mg)  ? One chewable (morning) ? One chewable (afternoon) ? One chewable (evening)  OR Liquid Calcium Citrate  ? 1 tbsp. Calcium Citrate ? 1 tbsp. Calcium Citrate ? 1 tbsp. Calcium Citrate  Vitamin D3: ? 5,000 IU daily  Vitamin B-12: ? 1,000 micrograms per day  Iron (menstruating women or  patients with a history of low iron):  ? 60 milligrams per day  from [facility] [employer]  Protein drinks (protein drinks should be under  3 grams of sugar and 3 grams of fat). Protein shake (30 to 40 grams per day):  ? a.m. ? p.m. Exercise: ? 30 to 40 minutes per  Educated on Diet Progression    [place] Gastric Bypass and Sleeve Dietary Progression    Patient Name:   Date of Surgery: Ice Chips start once admitted on floor. Begin Bariatric Clear Liquid Diet on:     Clear Liquid Diet: 64 oz. of fluid per day  o Low calorie, low sugar, non-carbonated beverages  - Water, Crystal Light, Propel Water, Sugar Free Jell-O, Sugar Free Popsicles, Bouillon  - Start protein supplement during this stage. (60-70 grams per day)  - Getting your fluid in and staying hydrated is your #1 priority! - The clear liquid diet will last for 7 days. Begin Bariatric Soft and Moist on:   - This stage of the diet will last for 5 weeks, unless otherwise instructed by your surgeon. - Begin:  1 week post-op   - End:  6 weeks post-op (or when you follow up with the Registered Dietitian)    - Soft, moist, high protein foods: 3 meals per day plus protein supplements. o   Portions should emphasize on soft protein.   o Portions will be a MAXIMUM of:  o  1 ounce of solid food  o  2-3 ounces of cottage cheese and yogurt. o Protein supplements should be between meals and provide 30-40 grams per day during soft protein diet. o Continue to get 64 ounces of fluid in per day. - Protein foods that are ok on the Soft and Moist Diet include:  o Slow transition:  o 1st week on soft protein should focus on: Yogurt, cottage cheese, eggs  o 2nd -4th  week on soft protein diet should focus on: yogurt, cottage cheese, eggs, canned tuna, canned chicken, tilapia, fish (needs to be soft enough to be cut up with a fork)  o 5th week on soft protein diet should focus on: Yogurt, cottage cheese, eggs, canned tuna, canned chicken, tilapia, fish, salmon, chicken breast, or turkey. Remember to continue to get 64 ounces of fluid daily on ALL Stages. To be advanced to Bariatric Maintenance Stage of the bariatric diet, follow up with the dietitian 6 weeks post-op, around: For any additional questions, please refer to your blue binder that was provided to you at the start of the bariatric program. Hydration  Hydration is your NUMBER ONE priority. Dehydration is the most common reason for readmission to the hospital. Dehydration occurs when  your body does not get enough fluid to keep it functioning at its best. Your body also requires fluid  to burn its stored fat calories for energy. Carry a bottle of water with you all day, even when you are away from home; remind yourself to  drink even if you dont feel thirsty. Drinking 64 ounces of fluid is your daily goal. You can tell if  youre getting enough fluid if youre making clear, light-colored urine five to 10 times per day. Signs of dehydration can be thirst, headache, hard stools or dizziness upon sitting or standing up. You should contact your surgeons office if you are unable to drink enough fluid to stay hydrated.   --   [address] after Surgery   No lifting over 15 pounds for four weeks.  No driving while taking the pain medication (about seven to 10 days).  No tub baths, swimming or hot tubs until incisions are healed (about two weeks).  You may shower. Clean incisions daily /gently with soap and check incisions for signs of infection:  -- Redness around incision. -- Swelling at site. -- Drainage with an foul odor (pus). -- Increase tenderness around incision.  Take your temperature and resting pulse in the morning and evening. Record on tracking form  given to you. Call if your temperature is greater than 101 or your pulse rate is greater than 115.  Please contact your surgeon if you are having excessive abdominal pain (that lasts longer than  four hours and does not improve with prescribed pain medication), vomiting or shortness of breath.  Get up and move -- do not sit in one place for more than an hour.  You need to WALK (EXERCISE) for 30 minutes per day. -- Walking around your house does not count. -- Bike, treadmill and elliptical are OK. -- NO weight lifting or sit ups.  If constipated take an adult dose of Miralax (available over the counter). Contact the doctors  office if Miralax doesnt help.  You may swallow pills starting the day after surgery as long as they fit inside this [place]:   Continue to use your incentive spirometer (breathing machine) for the next couple of weeks to  help prevent pneumonia. [address]  Temperature/Heart Rate Log  Take your temperature and heart rate (pulse) twice a day for 14 days. Take both in the morning and  evening at about the same time each day (when you wake up and before you go to bed when you  are relaxed). Please contact your doctors office if your:   Temperature is higher than 101 degrees.  Heart rate (pulse) is higher than 115 beats per minute  (normal heart rate is 60 to 100 beats per minute).   How to Take Your Heart Rate (Pulse)   Turn your left hand so that your palm is face-up.  With the index and middle fingers of your right  hand, draw a line from the base of your thumb to  just below the crease in your wrist. Your fingers  should nestle just to the left of the large tendon that  pops up when you bend your wrist toward you.  Dont press too hard, that will make the pulse go  away. Use gentle pressure.  Wait. It can take several seconds -- and several micro-adjustments in the placement of your two  fingers on your wrist -- to find your pulse. Just keep moving your fingers down or up your wrist  in small increments (and pausing for a few seconds) until you find it. How to Take Your Pulse Rate   Find a watch with a second hand and place it on your right wrist or on the table next  to your left hand.  After finding your pulse, count the number of beats for 20 seconds.  Multiply by three to get your heart rate, or beats per minute  (or just count for 60 seconds for a math-free option).  Normal, resting heart rate is about 60 to 100 beats per minute. -- 55 --  PATIENT GUIDE TO BARIATRIC [address]  Lovenox Self Injection Guide  Prepare  Step 1: Wash and dry your hands thoroughly. Step 2: Sit or lie in a comfortable position and choose an area  on the right or left side of the abdomen at least two inches away  from the belly button. Step 3: Clean the injection site with an alcohol swab and let dry. Inject  Step 4: Remove the needle cap by pulling it straight off the syringe and  discard it in a sharps . Step 5: With your other hand, pinch an inch of the cleansed area to  make a fold in the skin. Insert the full length of the needle straight  down -- at a 90? angle -- into the fold of skin. -- 50 --  PATIENT GUIDE TO BARIATRIC [address]  Step 6: Press the plunger with your thumb until the syringe is empty.   Then pull the needle straight out and release the skin fold.  Dispose  Step 7: Point the needle down and away from yourself and others,  and then push down on the plunger to activate the safety shield. Step 8: Place the used syringe in the sharps . Do NOT expel the air bubble from the syringe before the injection. Administration should be alternated between the left and right abdominal wall. The whole length  of the needle should be introduced into a skin fold held between the thumb and forefinger; the  skin fold should be held throughout the injection. To minimize bruising, do not rub the injection  site after completion of the injection. Questions about LOVENOX? Call [phone]  -- 49 --  PATIENT GUIDE TO [address] [place] [address]  9. DIET AND LIFESTYLE  Key Diet Principles Following Bariatric Surgery   Begin each meal with soft moist high protein foods (i.e. chicken, turkey, yogurt, tuna, eggs,  cottage cheese, other fish and seafood).  Consume a minimum of 64 ounces of fluid each day to prevent dehydration. No straws.  No food and fluid together. Stop drinking 30 minutes before a meal. You may begin fluids again  30 minutes after you finish a meal.   Eat very slowly and chew all foods completely.  Keep portions small.  No simple sugars or high fat foods. No carbonated beverages. No caffeine.  Eat three meals per day. No skipping. Avoid snacking between meals.  No alcohol. No smoking.  Two Flintstones Complete Chewable vitamins each day. Take one in the morning and one at night.  1,500 milligrams Calcium Citrate per day in separate dosages.  Vitamin D 3: 5,000 IU taken per day.  Vitamin B-12: Take 1,000 micrograms sublingually daily.  Iron: 60 milligrams per day from [facility] [employer].  Protein supplements of your choice. Must be low sugar (0 to 3 grams), low fat (0 to 3 grams) and  provide at least 35 to 40 grams of protein each day.  You need 60 to 70 grams of protein (food  and supplements) each day.  Minimum of 30 minutes of physical activity daily. Do not noe NSAIDS . Do not take Steroids without your surgeons permission. -- 48 --  [address]  Your Priorities After Surgery  ? Fluid: 64 ounces of fluid per day. ? Protein: 60 to 70 grams of protein per day. ? Walk every day. Clear Liquid Diet  One week of clear liquids: minimum of 64 ounces of fluid per day. Fluid:   Zero calorie liquids.  No caffeine.  No carbonation.  No sugary drinks.  No alcohol.  No straws. Food   Protein drinks.  Less than 3 grams of sugar and  3 grams of fat per serving.  Protein drink should provide you with  60 to 70 grams of protein. Soft Protein Diet  Five weeks of soft protein (1 ounce for soft protein, 3 ounces of yogurt/cottage cheese). Three meals per day and 1 protein shake. Protein shakes should provide you with 30 grams of  protein on the soft protein diet. Slow transition:   First week on soft protein diet -- focus on yogurt, cottage cheese, eggs, vegetarian refried beans,  black beans, kidney beans and white beans. (NO BAKED BEANS.)   Second through fourth week on soft protein diet -- focus on yogurt, cottage cheese, eggs,  canned tuna, canned chicken, tilapia and fish (needs to be soft enough to be cut up with a fork).  Fifth week on soft protein diet -- focus on yogurt, cottage cheese, eggs, canned tuna, canned  chicken, tilapia, fish, salmon, chicken breast or turkey. Fluid is your #1 Priority! Continue clear liquids between meals. You will need 64 ounces of fluid per day. Fluids that you can have include:   Water.  Zero calorie liquids. You will need to sip throughout the day and should therefore have a water bottle with you at all  times! No liquids with your meals. Stop 30 minutes before a meal and wait 30 minutes after a meal.  No straws. Zero calorie liquids. No caffeine. No carbonation.   No sugary drinks. No alcohol. -- 46 --  [address] [facility]  Protein  You will need 60 to 70 grams of protein per day.  60 to 70 grams of protein shakes when on the clear liquid diet (two to three shakes per day).  30 to 50 grams of protein shakes when on the soft protein diet (one shake per day). Eat Three Times Per Day  You will need to eat three times per day. My planned times are:  _________________________________________________________  _________________________________________________________  _________________________________________________________  Nausea, Vomiting, Stomach Pain  If you have problems with nausea, vomiting or stomach pain, try:   Eating slowly: 20 to 30 minutes per meal.   Chewing food thoroughly: 20 to 30 chews before food is swallowed.  Small portions: measure portions in medicine cup.  Stopping before feeling full.  AVOIDING SUGAR and FRIED FOOD: sugar will cause dumping syndrome and lead to weight gain. Exercise  I will need to get a minimum of 30 minutes of exercise per day or 150 minutes of exercise per week.  Walking, swimming, biking or elliptical.   Find something you enjoy! Vitamins  After surgery, you will need to take the following vitamins for the rest of your life -- FOREVER.  Vitamin D 3: 5,000 IU per day.  Calcium Citrate: 1,500 milligrams, taken separately.  Flintstones Complete: two per day, taken separately.  Sublingual Vitamin B-12: 1,000 micrograms daily.  Iron for menstruating women or patients with a history of low iron: 65 milligrams daily. We recommend going to www.bariatricadRaise5age. com and purchasing iron from there. The lemon-lime has 60 milligrams. This iron is better absorbed than over-the-counter iron. -- 46 --  PATIENT GUIDE TO BARIATRIC [address]  Clear Liquid Log  Getting your fluid in is top priority during this week.   Fluids (MINIUM of 64 ounces per day):  ? 8 oz. ? 8 oz. ? 8 oz. ? 8 oz. ? 8 oz. ? 8 oz. ? 8 oz. ? 8 oz. ? 8 oz. ? 8 oz. ? 8 oz. ? 8 oz. Flintstones Complete Chewable: ? a.m. ? p.m. Calcium Citrate (1,500 milligrams/day):  Pill form  ? Two crushed pills (morning) ? Two crushed pills (afternoon) ? Two crushed pills (evening)  OR Upcal D (powder)  ? One pack/scoop ? One pack/scoop ? One pack/scoop  OR Celebrate Chewable Vitamins (500 mg each) or Bariatric Advantage Chewables (500 mg)  ? One chewable (morning) ? One chewable (afternoon) ? One chewable (evening)  OR Liquid Calcium Citrate  ? 1 tbsp. Calcium Citrate ? 1 tbsp. Calcium Citrate ? 1 tbsp. Calcium Citrate  Vitamin D3: ? 5,000 IU daily. Vitamin B-12: ? 1,000 micrograms per day. Iron (menstruating women or patients with a history of low iron):  ? 60 milligrams per day from Bariatric Advantage. Protein drinks (protein drinks should be under 3 grams of sugar and 3 grams of fat). Protein shake (60 grams per day): ? a.m. ? p.m. Exercise: ? 30 to 40 minutes per day. -- 48 --  PATIENT GUIDE TO BARIATRIC [address] [place] [address] Rd  Bariatric Soft and Moist Diet Shopping List   alcium Citrate (1,500 milligrams/day):  Pill form  ? Two crushed pills (morning) ? Two crushed pills (afternoon) ? Two crushed pills (evening)  OR Upcal D (powder)  ? One pack/scoop ? One pack/scoop ? One pack/scoop  OR Celebrate Chewable Vitamins (500 mg each) or Bariatric Advantage Chewables (500 mg)  ? One chewable (morning) ? One chewable (afternoon) ? One chewable (evening)  OR Liquid Calcium Citrate  ? 1 tbsp. Calcium Citrate ? 1 tbsp. Calcium Citrate ? 1 tbsp. Calcium Citrate  Vitamin D3: ? 5,000 IU daily  Vitamin B-12: ? 1,000 micrograms per day  Iron (menstruating women or  patients with a history of low iron):  ? 60 milligrams per day  from Bariatric Advantage  Protein drinks (protein drinks should be under  3 grams of sugar and 3 grams of fat). Protein shake (30 to 40 grams per day):  ? a.m. ? p.m. Exercise: ? 30 to 40 minutes per  Educated on Diet Progression    [facility] Dawson Gastric Bypass and Sleeve Dietary Progression    Patient Name:   Date of Surgery: Ice Chips start once admitted on floor. Begin Bariatric Clear Liquid Diet on:     Clear Liquid Diet: 64 oz. of fluid per day  o Low calorie, low sugar, non-carbonated beverages  - Water, Crystal Light, Propel Water, Sugar Free Jell-O, Sugar Free Popsicles, Bouillon  - Start protein supplement during this stage. (60-70 grams per day)  - Getting your fluid in and staying hydrated is your #1 priority! - The clear liquid diet will last for 7 days. Begin Bariatric Soft and Moist on:   - This stage of the diet will last for 5 weeks, unless otherwise instructed by your surgeon. - Begin:  1 week post-op   - End:  6 weeks post-op (or when you follow up with the Registered Dietitian)    - Soft, moist, high protein foods: 3 meals per day plus protein supplements. o   Portions should emphasize on soft protein. o Portions will be a MAXIMUM of:  o  1 ounce of solid food  o  2-3 ounces of cottage cheese and yogurt. o Protein supplements should be between meals and provide 30-40 grams per day during soft protein diet. o Continue to get 64 ounces of fluid in per day. - Protein foods that are ok on the Soft and Moist Diet include:  o Slow transition:  o 1st week on soft protein should focus on: Yogurt, cottage cheese, eggs  o 2nd -4th  week on soft protein diet should focus on: yogurt, cottage cheese, eggs, canned tuna, canned chicken, tilapia, fish (needs to be soft enough to be cut up with a fork)  o 5th week on soft protein diet should focus on: Yogurt, cottage cheese, eggs, canned tuna, canned chicken, tilapia, fish, salmon, chicken breast, or turkey. Remember to continue to get 64 ounces of fluid daily on ALL Stages.     To be advanced to Bariatric Maintenance Stage of the bariatric diet, follow up with the dietitian 6 weeks post-op, around:         For any additional questions, please refer to your blue binder that was provided to you at the start of the bariatric program.

## 2019-05-09 NOTE — PROGRESS NOTES
Bedside and Verbal shift change report given to Gabriel Borja RN (oncoming nurse) by Cory Thomas RN (offgoing nurse). Report included the following information SBAR, Kardex, Intake/Output and MAR. Laying in the bed watching tv, a&o denied acute respiratory distress at this time . Lap sites c/d/ oob. Assisted to bathroom gait steady back to bed call bell within reach. 2020 Ambulating in the hallway gait steady no complaint voiced. 2210 Up to bathroom remain stable voiced no complaint. 2335 Remain stable pain tolerable continue denying n/v/dizziness up to bathroom as needed voiding without issue. 0534 Pt remain stable uneventful shift assisted to bathroom as needed voiding w/out complaint. Pain under control w/scheduled tolradol&tylenol-cont denying n/v call bell within reach will continue to monitor. Bedside and Verbal shift change report given to Megha Do RN (oncoming nurse) by Gabriel Borja RN (offgoing nurse). Report included the following information SBAR, Kardex, Intake/Output, MAR and Recent Results.

## 2019-05-09 NOTE — PROGRESS NOTES
9900  Up  Walking in the hallway, voiding well, pain relieved with Tylenol, aware she can have Marta if needed. 1100  No nausea,pain under control, up walking in the hallway. 1330  Mediated with Tylenol prior to discharge, instructions given, verbalized understanding. Incision looks clean, changing her clothes right now, for discharge, been instructed about  Her ambulating and incisioinal care.

## 2019-05-09 NOTE — PROGRESS NOTES
conducted an initial consultation and Spiritual Assessment for Carli Alexis, who is a 35 y.o.,female. Patients Primary Language is: [place]. According to the patients EMR Christianity Affiliation is: Lutheran.  
 
The reason the Patient came to the hospital is:  
Patient Active Problem List  
 Diagnosis Date Noted  Morbid obesity ([facility] [address] 10/12/2018  BMI 45.0-49.9, adult ([facility] [address] 10/12/2018  Hypovitaminosis D 10/12/2018  Normal labor 02/27/2018 The  provided the following Interventions: 
Initiated a relationship of care and support. Provided information about Spiritual Care Services. Offered prayer and assurance of continued prayers on patient's behalf. The following outcomes were achieved: 
Patient expressed gratitude for 's visit. Assessment: 
There are no further spiritual or Sikhism issues which require intervention at this time. Plan: 
Chaplains will continue to follow and will provide pastoral care on an as needed/requested basis. Teri Huff M.Div. , [address] [address] [facility]: [phone]/LMO: [phone]

## 2019-05-09 NOTE — DISCHARGE SUMMARY
Bariatric Surgery Discharge Progress Note    Admission Date: 5/8/2019    Discharge Date: 5/9/2019      PREOPERATIVE DIAGNOSIS: Clinically severe obesity, body mass index of 46,   comorbidities of hypovit D.     POSTOPERATIVE DIAGNOSIS: Clinically severe obesity, body mass index of 46,   comorbidities of hypovit D    PROCEDURES: Laparoscopic Adriana-en-Y gastric bypass with 217 cm retrocolic   retrogastric Adriana limb, 40 cm biliopancreatic limb, 15 ml    tubularized gastric pouch applied to the lesser curvature of stomach      Discharge Diagnosis:  Clinically Severe Obesity, s/p laparoscopic gastric bypass surgery with comorbidities as listed above      Postop Complications: none    Hospital Course:  Patient was admitted on 5/8/2019 for scheduled bariatric surgery. Operation was without significant complication. Patient admitted to the floor postoperatively, monitored as per protocol. Diet sequentially advanced beginning POD 1, pain medications transitioned to oral during the hospital course. Currently the patient is afebrile, vital signs stable, tolerating a clear liquid diet with protein supplementation, voiding spontaneously, ambulatory with adequate pain control with oral medications and clear surgical sites without evidence of infection.     Discharge Diet:  Clear Liquid Bariatric Diet for 7 days, then soft moist protein diet for 5 weeks    Discharge Medications:    *All medications as per Medical Reconciliation Form*     Bariatric Chewable vitamins, 2 orally daily for life  Calcium Citrate 1500mg orally daily for life  Vitamin B12 1000micrograms sublingual daily for life  Oxycodone 5mg tab 1 by mouth every 4 hours as needed for pain uncontrolled with Tylenol  Enoxaparin (Lovenox) 40mg sub-Q daily for 7 days     Discharge disposition: home     Local wound care with daily showers, keep wounds clean and dry     Activity: as desired, no lifting greater than 15lbs or situps for 30 days     Special Instructions:            - No driving until activity is not influenced by incisional pain and off narcotics            - No bath or hot tub until wounds are healed            - Check pulse and temperature twice daily for 10 days            - Notify [facility] for a Temp >100.5 or Pulse>115     Follow-up with your surgeon in 2 weeks    MERLY Santana-BC

## 2019-05-16 ENCOUNTER — TELEPHONE (OUTPATIENT)
Dept: SURGERY | Age: 34
End: 2019-05-16

## 2019-05-16 NOTE — TELEPHONE ENCOUNTER
Getting in 59 ouces fluids tolerating soft moist protein diet no fever pulse good denies pain taking all supplements discussed with dr Lilli Gregory ok for pt to return to work on Monday no heavy lifting for 4 full weeks after surgery patient states understanding

## 2019-05-23 ENCOUNTER — TELEPHONE (OUTPATIENT)
Dept: SURGERY | Age: 34
End: 2019-05-23

## 2019-05-23 NOTE — TELEPHONE ENCOUNTER
Called to follow up with patient after attempts to reach her last week were unsuccessful. She has returned to work, with approval  and is doing well. 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 Admission Date: 5/8/19  Discharge Date: 5/9/19    Admission Diagnosis:  Clinically severe obesity, body mass index of 46,   comorbidities of hypovit D. Discharge Diagnosis:  Clinically Severe Obesity, s/p laparoscopic gastric bypass surgery with comorbidities as listed above     Procedures:   Laparoscopic Adriana-en-Y gastric bypass IJRV 681 HD retrocolic   retrogastric Adriana limb, 40 cm biliopancreatic limb, 15 ml    tubularized gastric pouch applied to the lesser curvature of stomach    Postop Complications: none    Patient has complaints of none   Pain is controlled . She reports no nausea and no vomiting. She is tolerating liquids without difficulty and has been back at work since Monday. Fluid intake:good  Protein intake:good  The patient's activity level: moderately active. Denies elevated heart rate, BP, temp.      Follow up appointments:     Surgeon: 5/24/19  Dietitian: 6/19/19

## 2019-05-24 ENCOUNTER — OFFICE VISIT (OUTPATIENT)
Dept: SURGERY | Age: 34
End: 2019-05-24

## 2019-05-24 VITALS
WEIGHT: 272 LBS | DIASTOLIC BLOOD PRESSURE: 76 MMHG | BODY MASS INDEX: 42.69 KG/M2 | HEART RATE: 64 BPM | SYSTOLIC BLOOD PRESSURE: 106 MMHG | HEIGHT: 67 IN | OXYGEN SATURATION: 99 % | TEMPERATURE: 97.1 F | RESPIRATION RATE: 18 BRPM

## 2019-05-24 DIAGNOSIS — K91.2 POSTOPERATIVE MALABSORPTION: Primary | ICD-10-CM

## 2019-05-24 DIAGNOSIS — R79.89 LOW VITAMIN D LEVEL: ICD-10-CM

## 2019-05-24 RX ORDER — MICONAZOLE NITRATE 2 %
1 CREAM WITH APPLICATOR VAGINAL 3 TIMES DAILY
COMMUNITY

## 2019-05-24 RX ORDER — LANOLIN ALCOHOL/MO/W.PET/CERES
CREAM (GRAM) TOPICAL DAILY
COMMUNITY

## 2019-05-24 RX ORDER — LANOLIN ALCOHOL/MO/W.PET/CERES
1000 CREAM (GRAM) TOPICAL DAILY
COMMUNITY

## 2019-05-24 NOTE — PROGRESS NOTES
Subjective:      Suzan Hyde is a 35 y.o. female is now 2 weeks status post laparoscopic gastric bypass surgery. Doing well overall. Currently on a stage 3 diet without difficulty. Taking in 64oz water,  60g protein. 30min of activity daily. Bowel movements are constipated. The patient is not having any pain. .  The patient is compliant with multivitamins, calcium and B12 supplements. Weight Loss Metrics 5/24/2019 5/24/2019 5/8/2019 5/6/2019 3/28/2019 3/28/2019 2/11/2019   Pre op / Initial Wt 295 - - - 295 - 299.4   Today's Wt - 272 lb - 296 lb - 295 lb -   BMI - 42.6 kg/m2 46.36 kg/m2 - - 46.2 kg/m2 -   Ideal Body Wt 140 - - - 140 - 140   Excess Body Wt 155 - - - 155 - 159.4   Goal Wt 171 - - - 171 - 172   Wt loss to date 23 - - - 0 - 0   % Wt Loss 0.19 - - - 0 - 0   80%  - - - 124 - 127.52       Body mass index is 42.6 kg/m². Comorbidities:    Hypertension: not applicable  Diabetes: not applicable  Obstructive Sleep Apnea: not applicable  Hyperlipidemia: not applicable  Stress Urinary Incontinence: not applicable  Gastroesophageal Reflux: not applicable  Weight related arthropathy:not applicable        Past Medical History:   Diagnosis Date    Asthma     Hyperprolactinemia ([address]     Hypothyroidism     Vitamin D deficiency        Past Surgical History:   Procedure Laterality Date    HX GI      gastric bypass       Current Outpatient Medications   Medication Sig Dispense Refill    calcium citrate-vitamin D3 (CITRACAL WITH VITAMIN D MAXIMUM) tablet Take 2 Tabs by mouth three (3) times daily.  cyanocobalamin (VITAMIN B-12) 1,000 mcg tablet Take 1,000 mcg by mouth daily.  thiamine HCL (VITAMIN B-1) 100 mg tablet Take  by mouth daily.  multivitamin (ONE A DAY) tablet Take 1 Tab by mouth two (2) times a day.  CHOLECALCIFEROL, VITAMIN D3, (VITAMIN D3 PO) Take 5,000 Units by mouth.          No Known Allergies      Objective:     Visit Vitals  /76   Pulse 64   Temp 97.1 °F (36.2 °C)   Resp 18   Ht 5' 7\" (1.702 m)   Wt 123.4 kg (272 lb)   LMP 05/08/2019   SpO2 99%   BMI 42.60 kg/m²       General:  alert, cooperative, no distress, appears stated age   Chest: lungs clear to auscultation, no accessory muscle use   Cor:   Regular rate and rhythm   Abdomen: soft, bowel sounds active, non-tender   Incision:   healing well, no drainage, no erythema, no hernia, no seroma, no swelling, no dehiscence, incision well approximated       Labs: none    Assessment:     Doing well postoperatively.     Plan:     Increase activity to the goal of 30 minutes daily, Follow up labs as ordered, Increase fluids, Follow up with Registered Dietician, Continue MVI/Ca/B12 supplementation and add Miralax daily  Follow up in 3 months

## 2019-05-24 NOTE — PROGRESS NOTES
Chief Complaint   Patient presents with    Post OP Follow Up     Laparoscopic Adriana-en-Y gastric bypass with 166 cm retrocolic 9/1/7751   1. Have you been to the ER, urgent care clinic since your last visit? Hospitalized since your last visit? No    2. Have you seen or consulted any other health care providers outside of the [address] [place] [address] since your last visit? Include any pap smears or colon screening. No                Pt ID confirmed    Weight Loss Metrics 5/24/2019 5/24/2019 5/8/2019 5/6/2019 3/28/2019 3/28/2019 2/11/2019   Pre op / Initial Wt 295 - - - 295 - 299.4   Today's Wt - 272 lb - 296 lb - 295 lb -   BMI - 42.6 kg/m2 46.36 kg/m2 - - 46.2 kg/m2 -   Ideal Body Wt 140 - - - 140 - 140   Excess Body Wt 155 - - - 155 - 159.4   Goal Wt 171 - - - 171 - 172   Wt loss to date 23 - - - 0 - 0   % Wt Loss 0.19 - - - 0 - 0   80%  - - - 124 - 127.52       Body mass index is 42.6 kg/m².     Post op medications:  MVI: 2x  Calcium Citrate: 1500 milligrams  Actigal 0  B-12 1000 micrograms  Vit D 3 5000 units   Vit b1 100 milligram

## 2019-05-24 NOTE — LETTER
5/24/19 Patient: Richardson Gonzalez YOB: 1985 Date of Visit: 5/24/2019 Rox Murillo MD 
[address] [address] VIA Facsimile: [phone] Dear Rox Murillo MD, Thank you for referring Ms. Spring Tello to [place] [address] for evaluation. My notes for this consultation are attached. If you have questions, please do not hesitate to call me. I look forward to following your patient along with you.  
 
 
Sincerely, 
 
Clara Bazan MD

## 2019-06-19 ENCOUNTER — HOSPITAL ENCOUNTER (OUTPATIENT)
Dept: BARIATRICS/WEIGHT MGMT | Age: 34
Discharge: HOME | End: 2019-06-19

## 2019-06-19 ENCOUNTER — DOCUMENTATION ONLY (OUTPATIENT)
Dept: BARIATRICS/WEIGHT MGMT | Age: 34
End: 2019-06-19

## 2019-06-19 NOTE — PROGRESS NOTES
[facility]  POST-OP NUTRITION FOLLOW UP    Patient's Name: Suzan Hyde  YOB: 1985  Surgery Date: 19      Procedure: Gastric Bypass    Surgeon: Dr. Tejas Ashton    Height: 5 f 7     Pre-Op Weight: 295     Current Weight: 259  Weight Lost: 36    BMI:  40.6    Attendance of support group: Yes  When:   Why not:     Complications  Readmittance: None  Reoperations: None  Complications: None  IV Fluids: None  ER Trips: None    Problem Areas:   Nausea: Early on after surgery   Vomiting: Early on after surgery. States she ate a bite too big. Dumping Syndrome: None  Inadequate Protein: Yes. Patient states she is struggling with this. She states they are not agreeing with her. She has tried Bariatric Fusion, Unjury, and Protein 20. She states she gets an upset stomach from it. Inadequate Fluids: Yes. Patient states she is almost at 64 ounces. Food Intolerance: None  Hunger: None  Constipation:  Early on, but states she was prescribed Miralax by Dr. Gen Rosa 3 Meals/Day: Yes  Portion Size at Meals: 2 ounces     Protein from Food:     Foods being consumed:  Breakfast: Time: 8-9 am:  Eggs  Lunch: Time: 12:00 pm:   Canned tuna, chicken breast.  Patient states she has also done some fish. Dinner:  Time: 6:00 pm:   Same as lunch. In-between eating: None. States she is just trying to get the protein shake in. Length of time for meals: 20-30 minutes     food/fluids: 30/30    Fluids: 48 oz/day   Types of Fluids: water and some protein shakes    MVI: Gill    Number/Day: bid   Taken Separately: yes    Vitamin B1: Yes  Dosin mg    Calcium: Pills and splitting in half    Calcium Dosin mg    Taken Separately: yes    Vitamin B12: sublingual   Vitamin B12 Dosin mcg     Vitamin D:      Vitamin D dosin IU    Iron:     Iron dosin mg     Protein Supplement: Trying a variety and hoping to get something down.   Samples of Unjury were provided. Grams of Protein:    Mixed with:      Splitting Protein Drink in 1/2:    Timing of Protein Drinks:   Patient is taking 7 days a week. Exercise: Walking at her lunch break      Comments:    Patient is doing well at 6 weeks post op. Her portions are appropriate. She is struggling some with protein shakes. I have given her samples of Unjury. She is also encouraged to work on increasing her fluid intake. She is taking all vitamins, as instructed. She is walking daily and plans to start going back to the gym. Will monitor her progress. Patient was educated on the importance of eating meat and vegetables only. I have talked with patient about the effects of carbohydrates, not only from a weight management perspective, but also what effects it could have on their blood sugar and what reactive hypoglycemia is.         Diet Follow Up:  9 month class scheduled for:  February 7 at 9:30 am    Nicholas Harry [address]    6/19/2019

## 2019-06-26 ENCOUNTER — TELEPHONE (OUTPATIENT)
Dept: SURGERY | Age: 34
End: 2019-06-26

## 2019-06-26 NOTE — TELEPHONE ENCOUNTER
Patient called and stated that she had surgery on May 8th and that she feels like she is having menstrual bleeding more frequently, sometimes every few days. Patient was requesting to know if this was something that was normal. I notified patient that this can happen s/p GBP and that she should just make sure that she is keeping track of it. Notified her that if she felt like the bleeding was increasing in frequency or amount to please let us know. Patient gave verbal understanding. Closing encounter.

## 2019-07-24 ENCOUNTER — TELEPHONE (OUTPATIENT)
Dept: SURGERY | Age: 34
End: 2019-07-24

## 2019-07-24 NOTE — TELEPHONE ENCOUNTER
Patient called to say she still is having heavy menstrual cycles instructed to call her gyn she states understanding

## 2019-07-26 ENCOUNTER — HOSPITAL ENCOUNTER (OUTPATIENT)
Dept: LAB | Age: 34
Discharge: HOME | End: 2019-07-26
Payer: COMMERCIAL

## 2019-07-26 DIAGNOSIS — K91.2 POSTOPERATIVE MALABSORPTION: ICD-10-CM

## 2019-07-26 DIAGNOSIS — R79.89 LOW VITAMIN D LEVEL: ICD-10-CM

## 2019-07-26 LAB
25(OH)D3 SERPL-MCNC: 51.2 NG/ML (ref 30–100)
ALBUMIN SERPL-MCNC: 3.5 G/DL (ref 3.4–5)
ANION GAP SERPL CALC-SCNC: 9 MMOL/L (ref 3–18)
BASOPHILS # BLD: 0 K/UL (ref 0–0.1)
BASOPHILS NFR BLD: 0 % (ref 0–2)
BUN SERPL-MCNC: 9 MG/DL (ref 7–18)
BUN/CREAT SERPL: 12 (ref 12–20)
CALCIUM SERPL-MCNC: 8.8 MG/DL (ref 8.5–10.1)
CHLORIDE SERPL-SCNC: 109 MMOL/L (ref 100–111)
CO2 SERPL-SCNC: 26 MMOL/L (ref 21–32)
CREAT SERPL-MCNC: 0.74 MG/DL (ref 0.6–1.3)
DIFFERENTIAL METHOD BLD: ABNORMAL
EOSINOPHIL # BLD: 0.1 K/UL (ref 0–0.4)
EOSINOPHIL NFR BLD: 2 % (ref 0–5)
ERYTHROCYTE [DISTWIDTH] IN BLOOD BY AUTOMATED COUNT: 14.8 % (ref 11.6–14.5)
FERRITIN SERPL-MCNC: 102 NG/ML (ref 8–388)
FOLATE SERPL-MCNC: >20 NG/ML (ref 3.1–17.5)
GLUCOSE SERPL-MCNC: 78 MG/DL (ref 74–99)
HCT VFR BLD AUTO: 38.1 % (ref 35–45)
HGB BLD-MCNC: 12.3 G/DL (ref 12–16)
IRON SERPL-MCNC: 90 UG/DL (ref 50–175)
LYMPHOCYTES # BLD: 1.5 K/UL (ref 0.9–3.6)
LYMPHOCYTES NFR BLD: 46 % (ref 21–52)
MCH RBC QN AUTO: 30.1 PG (ref 24–34)
MCHC RBC AUTO-ENTMCNC: 32.3 G/DL (ref 31–37)
MCV RBC AUTO: 93.2 FL (ref 74–97)
MONOCYTES # BLD: 0.5 K/UL (ref 0.05–1.2)
MONOCYTES NFR BLD: 15 % (ref 3–10)
NEUTS SEG # BLD: 1.2 K/UL (ref 1.8–8)
NEUTS SEG NFR BLD: 37 % (ref 40–73)
PLATELET # BLD AUTO: 200 K/UL (ref 135–420)
PMV BLD AUTO: 11.2 FL (ref 9.2–11.8)
POTASSIUM SERPL-SCNC: 3.8 MMOL/L (ref 3.5–5.5)
RBC # BLD AUTO: 4.09 M/UL (ref 4.2–5.3)
SODIUM SERPL-SCNC: 144 MMOL/L (ref 136–145)
VIT B12 SERPL-MCNC: >2000 PG/ML (ref 211–911)
WBC # BLD AUTO: 3.2 K/UL (ref 4.6–13.2)

## 2019-07-26 PROCEDURE — 83540 ASSAY OF IRON: CPT

## 2019-07-26 PROCEDURE — 80048 BASIC METABOLIC PNL TOTAL CA: CPT

## 2019-07-26 PROCEDURE — 36415 COLL VENOUS BLD VENIPUNCTURE: CPT

## 2019-07-26 PROCEDURE — 85025 COMPLETE CBC W/AUTO DIFF WBC: CPT

## 2019-07-26 PROCEDURE — 82306 VITAMIN D 25 HYDROXY: CPT

## 2019-07-26 PROCEDURE — 82607 VITAMIN B-12: CPT

## 2019-07-26 PROCEDURE — 82040 ASSAY OF SERUM ALBUMIN: CPT

## 2019-07-26 PROCEDURE — 84425 ASSAY OF VITAMIN B-1: CPT

## 2019-07-26 PROCEDURE — 82728 ASSAY OF FERRITIN: CPT

## 2019-07-28 LAB — VIT B1 BLD-SCNC: 169.3 NMOL/L (ref 66.5–200)

## 2019-08-15 ENCOUNTER — OFFICE VISIT (OUTPATIENT)
Dept: SURGERY | Age: 34
End: 2019-08-15

## 2019-08-15 VITALS
DIASTOLIC BLOOD PRESSURE: 76 MMHG | OXYGEN SATURATION: 98 % | RESPIRATION RATE: 16 BRPM | WEIGHT: 238.2 LBS | HEART RATE: 71 BPM | TEMPERATURE: 98.4 F | SYSTOLIC BLOOD PRESSURE: 122 MMHG | HEIGHT: 67 IN | BODY MASS INDEX: 37.39 KG/M2

## 2019-08-15 DIAGNOSIS — E66.09 CLASS 2 OBESITY DUE TO EXCESS CALORIES WITHOUT SERIOUS COMORBIDITY WITH BODY MASS INDEX (BMI) OF 37.0 TO 37.9 IN ADULT: Primary | ICD-10-CM

## 2019-08-15 DIAGNOSIS — K91.2 POSTSURGICAL MALABSORPTION: ICD-10-CM

## 2019-08-15 RX ORDER — LANOLIN ALCOHOL/MO/W.PET/CERES
CREAM (GRAM) TOPICAL
COMMUNITY

## 2019-08-15 RX ORDER — ASCORBIC ACID 500 MG
TABLET ORAL
COMMUNITY

## 2019-08-15 NOTE — PROGRESS NOTES
Chief Complaint   Patient presents with    Follow-up     GBP 5/8/2019. Pt ID confirmed    Weight Loss Metrics 8/15/2019 8/15/2019 5/24/2019 5/24/2019 5/8/2019 5/6/2019 3/28/2019   Pre op / Initial Wt 295 - 295 - - - 295   Today's Wt - 238 lb 3.2 oz - 272 lb - 296 lb -   BMI - 37.31 kg/m2 - 42.6 kg/m2 46.36 kg/m2 - -   Ideal Body Wt 140 - 140 - - - 140   Excess Body Wt 155 - 155 - - - 155   Goal Wt 171 - 171 - - - 171   Wt loss to date 56.8 - 23 - - - 0   % Wt Loss 0.46 - 0.19 - - - 0   80%  - 124 - - - 124       Body mass index is 37.31 kg/m². Post op medications:  MVI: flintstones twice daily  Calcium Citrate: 1500 milligrams  B-1 100 milligrams  B-12 1000 micrograms  Vit D 3 5000 units.

## 2019-08-15 NOTE — PROGRESS NOTES
Ana Owens is a 35 y.o. female is now 3 months status post laparoscopic gastric bypass surgery. Doing well overall. Currently on a bariatric stage IV diet without difficulty. Taking in 56-64 oz water, 30 g protein. 30 min of activity 4 days a week. Bowel movements are regular. The patient is not having any pain. . She is compliant with multivitamins, calcium, Vit D and B12 supplements. Weight Loss Metrics 8/15/2019 8/15/2019 5/24/2019 5/24/2019 5/8/2019 5/6/2019 3/28/2019   Pre op / Initial Wt 295 - 295 - - - 295   Today's Wt - 238 lb 3.2 oz - 272 lb - 296 lb -   BMI - 37.31 kg/m2 - 42.6 kg/m2 46.36 kg/m2 - -   Ideal Body Wt 140 - 140 - - - 140   Excess Body Wt 155 - 155 - - - 155   Goal Wt 171 - 171 - - - 171   Wt loss to date 56.8 - 23 - - - 0   % Wt Loss 0.46 - 0.19 - - - 0   80%  - 124 - - - 124       Body mass index is 37.31 kg/m². Comorbidities:    Hypertension: not applicable  Diabetes: not applicable  Obstructive Sleep Apnea: not applicable  Hyperlipidemia: not applicable  Stress Urinary Incontinence: not applicable  Gastroesophageal Reflux: not applicable  Weight related arthropathy:not applicable        Past Medical History:   Diagnosis Date    Asthma     [facility] ([facility] [address]     Hypothyroidism     Vitamin D deficiency        Past Surgical History:   Procedure Laterality Date    HX GI  05/08/2019    gastric bypass       Current Outpatient Medications   Medication Sig Dispense Refill    calcium citrate-vitamin D3 (CITRACAL WITH VITAMIN D MAXIMUM) tablet Take 1 Tab by mouth three (3) times daily. Indications: 500mg each      cyanocobalamin (VITAMIN B-12) 1,000 mcg tablet Take 1,000 mcg by mouth daily.  thiamine HCL (VITAMIN B-1) 100 mg tablet Take  by mouth daily.  multivitamin (ONE A DAY) tablet Take 1 Tab by mouth two (2) times a day.  CHOLECALCIFEROL, VITAMIN D3, (VITAMIN D3 PO) Take 5,000 Units by mouth.          No Known Allergies    ROS:  Positive in BOLD  CONST: Fever, weight loss, fatigue or chills  GI: Nausea, vomiting, abdominal pain, change in bowel habits, hematochezia, melena, and GERD   INTEG: Dermatitis, abnormal moles  HEENT: Recent changes in vision, vertigo, epistaxis, dysphagia and hoarseness  CV: Chest pain, palpitations, HTN, edema and varicosities  RESP: Cough, shortness of breath, wheezing, hemoptysis, snoring and reactive airway disease  : Hematuria, dysuria, frequency, urgency, nocturia and stress urinary incontinence   MS: Weakness, joint pain and arthritis  ENDO: Diabetes, thyroid disease, polyuria, polydipsia, polyphagia, poor wound healing, heat intolerance, cold intolerance  LYMPH/HEME: Anemia, bruising and history of blood transfusions  NEURO: Dizziness, headache, fainting, seizures and stroke  PSYCH: Anxiety and depression      Physicial Exam:  Visit Vitals  /76   Pulse 71   Temp 98.4 °F (36.9 °C) (Oral)   Resp 16   Ht 5' 7\" (1.702 m)   Wt 108 kg (238 lb 3.2 oz)   LMP 05/02/2019   SpO2 98%   BMI 37.31 kg/m²     General: 35 y.o.) female in no acute distress. HEENT: Normocephalic, atraumatic, Pupils equal and reactive, nasopharynx clear, oropharynx clear and moist without lesions  NECK: Supple, no lymphadenopathy, thyromegaly, carotid bruits or jugular venous distension. trachea midline  RESP: Clear to auscultation bilaterally, no wheezes, rhonchi, or rales, normal respiratory excursion  CV: Regular rate and rhythm, no murmurs, rubs or gallops. 3+/4 pulses in bilateral dorsalis pedis and posterior tibialis. No distal edema or varicosities. ABD: Soft, nontender, nondistended, normoactive bowel sounds, small umbilical hernia, no hepatosplenomegaly  Extremities: Warm, well perfused, no tenderness or swelling, normal gait/station  Neuro: Sensation and strength grossly intact and symmetrical  Psych: Alert and oriented to person, place, and time.       Labs: reviewed     Assessment/Plan: Pt is currently 3 months s/p laparoscopic gastric bypass surgery with a total weight loss of 46 lbs to date, doing good   Labs were reviewed, patient asked to obtain labs prior to next visit   Stressed importance of hydration with SF clear liquids until urine clear. OK to continue current diet, with food content mainly meats/veggies. Encouraged support group attendance  Advised to continue current exercise regimen       Follow-up and Dispositions    · Return in about 3 months (around 11/15/2019).              Verona Snyder, MERLY-BC

## 2019-11-08 ENCOUNTER — HOSPITAL ENCOUNTER (OUTPATIENT)
Dept: LAB | Age: 34
Discharge: HOME | End: 2019-11-08
Payer: COMMERCIAL

## 2019-11-08 DIAGNOSIS — K91.2 POSTSURGICAL MALABSORPTION: ICD-10-CM

## 2019-11-08 LAB
25(OH)D3 SERPL-MCNC: 41.6 NG/ML (ref 30–100)
ALBUMIN SERPL-MCNC: 3.8 G/DL (ref 3.4–5)
ANION GAP SERPL CALC-SCNC: 6 MMOL/L (ref 3–18)
BASOPHILS # BLD: 0 K/UL (ref 0–0.1)
BASOPHILS NFR BLD: 0 % (ref 0–2)
BUN SERPL-MCNC: 10 MG/DL (ref 7–18)
BUN/CREAT SERPL: 13 (ref 12–20)
CALCIUM SERPL-MCNC: 9.1 MG/DL (ref 8.5–10.1)
CHLORIDE SERPL-SCNC: 106 MMOL/L (ref 100–111)
CO2 SERPL-SCNC: 28 MMOL/L (ref 21–32)
CREAT SERPL-MCNC: 0.8 MG/DL (ref 0.6–1.3)
DIFFERENTIAL METHOD BLD: ABNORMAL
EOSINOPHIL # BLD: 0 K/UL (ref 0–0.4)
EOSINOPHIL NFR BLD: 1 % (ref 0–5)
ERYTHROCYTE [DISTWIDTH] IN BLOOD BY AUTOMATED COUNT: 13.9 % (ref 11.6–14.5)
FERRITIN SERPL-MCNC: 104 NG/ML (ref 8–388)
FOLATE SERPL-MCNC: >20 NG/ML (ref 3.1–17.5)
GLUCOSE SERPL-MCNC: 79 MG/DL (ref 74–99)
HCT VFR BLD AUTO: 37.5 % (ref 35–45)
HGB BLD-MCNC: 12.7 G/DL (ref 12–16)
IRON SERPL-MCNC: 81 UG/DL (ref 50–175)
LYMPHOCYTES # BLD: 1.5 K/UL (ref 0.9–3.6)
LYMPHOCYTES NFR BLD: 48 % (ref 21–52)
MCH RBC QN AUTO: 31.8 PG (ref 24–34)
MCHC RBC AUTO-ENTMCNC: 33.9 G/DL (ref 31–37)
MCV RBC AUTO: 93.8 FL (ref 74–97)
MONOCYTES # BLD: 0.4 K/UL (ref 0.05–1.2)
MONOCYTES NFR BLD: 12 % (ref 3–10)
NEUTS SEG # BLD: 1.2 K/UL (ref 1.8–8)
NEUTS SEG NFR BLD: 39 % (ref 40–73)
PLATELET # BLD AUTO: 225 K/UL (ref 135–420)
PMV BLD AUTO: 11.1 FL (ref 9.2–11.8)
POTASSIUM SERPL-SCNC: 3.8 MMOL/L (ref 3.5–5.5)
RBC # BLD AUTO: 4 M/UL (ref 4.2–5.3)
SODIUM SERPL-SCNC: 140 MMOL/L (ref 136–145)
VIT B12 SERPL-MCNC: >2000 PG/ML (ref 211–911)
WBC # BLD AUTO: 3.1 K/UL (ref 4.6–13.2)

## 2019-11-08 PROCEDURE — 82306 VITAMIN D 25 HYDROXY: CPT

## 2019-11-08 PROCEDURE — 84425 ASSAY OF VITAMIN B-1: CPT

## 2019-11-08 PROCEDURE — 82607 VITAMIN B-12: CPT

## 2019-11-08 PROCEDURE — 82728 ASSAY OF FERRITIN: CPT

## 2019-11-08 PROCEDURE — 82040 ASSAY OF SERUM ALBUMIN: CPT

## 2019-11-08 PROCEDURE — 80048 BASIC METABOLIC PNL TOTAL CA: CPT

## 2019-11-08 PROCEDURE — 36415 COLL VENOUS BLD VENIPUNCTURE: CPT

## 2019-11-08 PROCEDURE — 83540 ASSAY OF IRON: CPT

## 2019-11-08 PROCEDURE — 85025 COMPLETE CBC W/AUTO DIFF WBC: CPT

## 2019-11-10 LAB — VIT B1 BLD-SCNC: 145.3 NMOL/L (ref 66.5–200)

## 2019-11-21 ENCOUNTER — OFFICE VISIT (OUTPATIENT)
Dept: SURGERY | Age: 34
End: 2019-11-21

## 2019-11-21 VITALS
RESPIRATION RATE: 16 BRPM | SYSTOLIC BLOOD PRESSURE: 131 MMHG | DIASTOLIC BLOOD PRESSURE: 74 MMHG | WEIGHT: 212 LBS | HEART RATE: 70 BPM | BODY MASS INDEX: 33.27 KG/M2 | HEIGHT: 67 IN | TEMPERATURE: 98.5 F

## 2019-11-21 DIAGNOSIS — E66.9 CLASS 1 OBESITY WITHOUT SERIOUS COMORBIDITY WITH BODY MASS INDEX (BMI) OF 33.0 TO 33.9 IN ADULT, UNSPECIFIED OBESITY TYPE: ICD-10-CM

## 2019-11-21 DIAGNOSIS — Z98.84 HISTORY OF ROUX-EN-Y GASTRIC BYPASS: ICD-10-CM

## 2019-11-21 DIAGNOSIS — K91.2 POSTSURGICAL MALABSORPTION: Primary | ICD-10-CM

## 2019-11-21 NOTE — PROGRESS NOTES
Pt ID confirmed    Weight Loss Metrics 11/21/2019 11/21/2019 8/15/2019 8/15/2019 5/24/2019 5/24/2019 5/8/2019   Pre op / Initial Wt 295 - 295 - 295 - -   Today's Wt - 212 lb - 238 lb 3.2 oz - 272 lb -   BMI - 33.2 kg/m2 - 37.31 kg/m2 - 42.6 kg/m2 46.36 kg/m2   Ideal Body Wt 140 - 140 - 140 - -   Excess Body Wt 155 - 155 - 155 - -   Goal Wt - - 171 - 171 - -   Wt loss to date - - 56.8 - 23 - -   % Wt Loss - - 0.46 - 0.19 - -   80%  - 124 - 124 - -       Body mass index is 33.2 kg/m².     Post op medications:  MVI: x2 day  Calcium Citrate: 1500mg  Iron 65mg  B-12 1000mcg  Vit D 3 5000units

## 2019-11-21 NOTE — PROGRESS NOTES
Vivien Aaron is a 29 y.o. female is now 6 months status post laparoscopic gastric bypass surgery. Doing well overall. Currently on a bariatric stage 5 diet without difficulty. Taking in 40 oz water, >30  g protein. 30 min of activity 4 days a week. Bowel movements are regular. The patient is not having any pain. . She is compliant with multivitamins, calcium, Vit D and B1 & 12 supplements. Patient denies use of NSAIDs, ETOH, carbonated beverages or use of straws. Weight Loss Metrics 11/21/2019 11/21/2019 8/15/2019 8/15/2019 5/24/2019 5/24/2019 5/8/2019   Pre op / Initial Wt 295 - 295 - 295 - -   Today's Wt - 212 lb - 238 lb 3.2 oz - 272 lb -   BMI - 33.2 kg/m2 - 37.31 kg/m2 - 42.6 kg/m2 46.36 kg/m2   Ideal Body Wt 140 - 140 - 140 - -   Excess Body Wt 155 - 155 - 155 - -   Goal Wt 171 - 171 - 171 - -   Wt loss to date - - 56.8 - 23 - -   % Wt Loss - - 0.46 - 0.19 - -   80%  - 124 - 124 - -       Body mass index is 33.2 kg/m². Comorbidities:    Hypertension: not applicable  Diabetes: not applicable  Obstructive Sleep Apnea: not applicable  Hyperlipidemia: not applicable  Stress Urinary Incontinence: not applicable  Gastroesophageal Reflux: not applicable  Weight related arthropathy:not applicable        Past Medical History:   Diagnosis Date    Asthma     Hyperprolactinemia ([facility] [address]     Hypothyroidism     Vitamin D deficiency        Past Surgical History:   Procedure Laterality Date    HX GI  05/08/2019    gastric bypass       Current Outpatient Medications   Medication Sig Dispense Refill    ascorbic acid, vitamin C, (VITAMIN C) 500 mg tablet Take  by mouth.  ferrous sulfate (IRON) 325 mg (65 mg iron) tablet Take  by mouth Daily (before breakfast).  calcium citrate-vitamin D3 (CITRACAL WITH VITAMIN D MAXIMUM) tablet Take 1 Tab by mouth three (3) times daily. Indications: 500mg each      cyanocobalamin (VITAMIN B-12) 1,000 mcg tablet Take 1,000 mcg by mouth daily.       thiamine HCL (VITAMIN B-1) 100 mg tablet Take  by mouth daily.  multivitamin (ONE A DAY) tablet Take 1 Tab by mouth two (2) times a day.  CHOLECALCIFEROL, VITAMIN D3, (VITAMIN D3 PO) Take 5,000 Units by mouth. No Known Allergies    ROS:  Positive in BOLD  CONST: Fever, weight loss, fatigue or chills  GI: Nausea, vomiting, abdominal pain, change in bowel habits, hematochezia, melena, and GERD   INTEG: Dermatitis, abnormal moles  HEENT: Recent changes in vision, vertigo, epistaxis, dysphagia and hoarseness  CV: Chest pain, palpitations, HTN, edema and varicosities  RESP: Cough, shortness of breath, wheezing, hemoptysis, snoring and reactive airway disease  : Hematuria, dysuria, frequency, urgency, nocturia and stress urinary incontinence   MS: Weakness, joint pain and arthritis  ENDO: Diabetes, thyroid disease, polyuria, polydipsia, polyphagia, poor wound healing, heat intolerance, cold intolerance  LYMPH/HEME: Anemia, bruising and history of blood transfusions  NEURO: Dizziness, headache, fainting, seizures and stroke  PSYCH: Anxiety and depression      Physicial Exam:  Visit Vitals  /74   Pulse 70   Temp 98.5 °F (36.9 °C) (Oral)   Resp 16   Ht 5' 7\" (1.702 m)   Wt 96.2 kg (212 lb)   BMI 33.20 kg/m²     General: 29 y.o.) female in no acute distress. HEENT: Normocephalic, atraumatic, Pupils equal and reactive, nasopharynx clear, oropharynx clear and moist without lesions  NECK: Supple, no lymphadenopathy, thyromegaly, carotid bruits or jugular venous distension. trachea midline  RESP: Clear to auscultation bilaterally, no wheezes, rhonchi, or rales, normal respiratory excursion  CV: Regular rate and rhythm, no murmurs, rubs or gallops. 3+/4 pulses in bilateral dorsalis pedis and posterior tibialis. No distal edema or varicosities.   ABD: Soft, nontender, nondistended, normoactive bowel sounds, no hernias, no hepatosplenomegaly  Extremities: Warm, well perfused, no tenderness or swelling, normal gait/station  Neuro: Sensation and strength grossly intact and symmetrical  Psych: Alert and oriented to person, place, and time. Labs: reviewed     Assessment/Plan: Pt is currently 6 months s/p laparoscopic gastric bypass surgery with a total weight loss of 83 lbs to date, doing well   Labs were reviewed with patient. Patient was instructed to continue multivitamins, calcium, Vit D and B1 & 12 supplements  Stressed importance of hydration with SF clear liquids until urine clear. Stressed importance of > 64 oz water daily   OK to advance diet as directed in bariatric manual with food content mainly meats/veggies. Encouraged support group attendance  Advised exercise program of 30 minutes daily             >50% of 25 minute visit spent face to face time with Dayr consisted of counseling & coordinating and/or discussing treatment plans in reference to her There were no encounter diagnoses.         MERLY Mann-BC

## 2020-02-06 ENCOUNTER — DOCUMENTATION ONLY (OUTPATIENT)
Dept: BARIATRICS/WEIGHT MGMT | Age: 35
End: 2020-02-06

## 2020-02-06 NOTE — PROGRESS NOTES
2/6/20:  Patient left a voicemail wanting to reschedule her 9 month post op appointment. I called patient back and left her a voicemail asking her to call me.     Edita Pozo MS RD

## 2020-03-06 ENCOUNTER — DOCUMENTATION ONLY (OUTPATIENT)
Dept: BARIATRICS/WEIGHT MGMT | Age: 35
End: 2020-03-06

## 2020-03-06 ENCOUNTER — HOSPITAL ENCOUNTER (OUTPATIENT)
Dept: BARIATRICS/WEIGHT MGMT | Age: 35
Discharge: HOME | End: 2020-03-06

## 2020-03-06 NOTE — PROGRESS NOTES
[address] [facility], [address]      Patient's Name: Mariana Martinez   Age: 29 y.o. YOB: 1985   Sex: female    Date:   3/6/2020    Height: 5 f 7 Weight:    181      Lbs. BMI: 28.4     Surgery Date: 5/8/19    Starting Weight: 295   Last Recorded Weight: 6/19: 259  Overall Pounds Lost: 114     Procedure: Gastric Bypass    Lowest Weight patient has achieved since surgery: Current    How long has patient been at this weight for:     Other Pertinent Information:     Diet History (reported by patient on diet history form)    Do you smoke: None    Alcohol Intake: None drinks at a time, None times a week. Meals per day: 3    Portion sizes ~: Size of deck of cards plus vegetables    Simple sugar intake: She states she is sometimes making 100 calorie snack packs. Experiencing dumping syndrome: She states she has had dumping syndrome 2 x . Carbohydrate intake: Eating some potatoes and low carbohydrate bread. Symptoms that occur when carbohydrates are consumed: She states she is feeling slugging and shaky. Ounces of fluid consumed per day: 60    Fluids being consumed: water    Patient is sometimes drinking protein drinks    Patient is snacking on: States she may have something protein based, like nuts    Exercise History    Patient is doing cardio for 30 minutes daily for exercise. Vitamins    Patient is taking bid Multivitamins per day    Patient is taking Calcium in the form of chewable. Patient is taking 1500 mg per day. Patient is taking 1000 mcg of Vitamin B12. Patient is taking 5000 IU of Vitamin D 3 today. Patient is taking 100 mg of Vitamin B1. Summary:  Patient attended 9 month post-op class. Weight loss is at 114 pounds. I have reinforced the key diet principles to the patient. Patient was instructed to follow a 3 meal a day routine.   I have reinforced the importance of filling up on meat and vegetables and avoiding carbohydrates. I have talked with patient about reactive hypoglycemia and although carbohydrates are not good from a weight-management point of view, they are actually very dangerous and should be avoided. If patient is getting hungry between meals focus on meat and vegetables and a list of food choices was reviewed with patient. Reinforced to patient the importance of being properly hydrated and the need to get 64 ounces of fluid in per day. Reinforced to patient the need to do 30 minutes of exercise per day. We also spent some time talking about the vitamins and the importance of taking them. Reinforced the dosage of vitamins to patient. Patient was reminded that the vitamins are lifelong. Other Pertinent Information: Patient is overall doing well at 9 months post op. Patient is focusing on meat and vegetables and keeping her carbohydrates overall low.       Nicholas Bahena Norberto 87 RD  3/6/2020

## 2020-04-22 ENCOUNTER — HOSPITAL ENCOUNTER (OUTPATIENT)
Dept: LAB | Age: 35
Discharge: HOME | End: 2020-04-22
Payer: COMMERCIAL

## 2020-04-22 DIAGNOSIS — K91.2 POSTSURGICAL MALABSORPTION: ICD-10-CM

## 2020-04-22 LAB
25(OH)D3 SERPL-MCNC: 36.5 NG/ML (ref 30–100)
ALBUMIN SERPL-MCNC: 3.7 G/DL (ref 3.4–5)
ANION GAP SERPL CALC-SCNC: 8 MMOL/L (ref 3–18)
BASOPHILS # BLD: 0 K/UL (ref 0–0.06)
BASOPHILS NFR BLD: 0 % (ref 0–3)
BUN SERPL-MCNC: 12 MG/DL (ref 7–18)
BUN/CREAT SERPL: 15 (ref 12–20)
CALCIUM SERPL-MCNC: 9 MG/DL (ref 8.5–10.1)
CHLORIDE SERPL-SCNC: 108 MMOL/L (ref 100–111)
CO2 SERPL-SCNC: 26 MMOL/L (ref 21–32)
CREAT SERPL-MCNC: 0.78 MG/DL (ref 0.6–1.3)
DIFFERENTIAL METHOD BLD: ABNORMAL
EOSINOPHIL # BLD: 0 K/UL (ref 0–0.4)
EOSINOPHIL NFR BLD: 0 % (ref 0–5)
ERYTHROCYTE [DISTWIDTH] IN BLOOD BY AUTOMATED COUNT: 13.8 % (ref 11.6–14.5)
FERRITIN SERPL-MCNC: 90 NG/ML (ref 8–388)
FOLATE SERPL-MCNC: >20 NG/ML (ref 3.1–17.5)
GLUCOSE SERPL-MCNC: 76 MG/DL (ref 74–99)
HCT VFR BLD AUTO: 36 % (ref 35–45)
HGB BLD-MCNC: 12 G/DL (ref 12–16)
IRON SERPL-MCNC: 92 UG/DL (ref 50–175)
LYMPHOCYTES # BLD: 1.6 K/UL (ref 0.8–3.5)
LYMPHOCYTES NFR BLD: 55 % (ref 20–51)
MCH RBC QN AUTO: 32.1 PG (ref 24–34)
MCHC RBC AUTO-ENTMCNC: 33.3 G/DL (ref 31–37)
MCV RBC AUTO: 96.3 FL (ref 74–97)
MONOCYTES # BLD: 0.3 K/UL (ref 0–1)
MONOCYTES NFR BLD: 9 % (ref 2–9)
NEUTS SEG # BLD: 1 K/UL (ref 1.8–8)
NEUTS SEG NFR BLD: 36 % (ref 42–75)
PLATELET # BLD AUTO: 198 K/UL (ref 135–420)
PLATELET COMMENTS,PCOM: ABNORMAL
PMV BLD AUTO: 10.8 FL (ref 9.2–11.8)
POTASSIUM SERPL-SCNC: 4.5 MMOL/L (ref 3.5–5.5)
RBC # BLD AUTO: 3.74 M/UL (ref 4.2–5.3)
RBC MORPH BLD: ABNORMAL
SODIUM SERPL-SCNC: 142 MMOL/L (ref 136–145)
VIT B12 SERPL-MCNC: >2000 PG/ML (ref 211–911)
WBC # BLD AUTO: 2.9 K/UL (ref 4.6–13.2)

## 2020-04-22 PROCEDURE — 82728 ASSAY OF FERRITIN: CPT

## 2020-04-22 PROCEDURE — 82040 ASSAY OF SERUM ALBUMIN: CPT

## 2020-04-22 PROCEDURE — 84425 ASSAY OF VITAMIN B-1: CPT

## 2020-04-22 PROCEDURE — 82306 VITAMIN D 25 HYDROXY: CPT

## 2020-04-22 PROCEDURE — 80048 BASIC METABOLIC PNL TOTAL CA: CPT

## 2020-04-22 PROCEDURE — 85025 COMPLETE CBC W/AUTO DIFF WBC: CPT

## 2020-04-22 PROCEDURE — 36415 COLL VENOUS BLD VENIPUNCTURE: CPT

## 2020-04-22 PROCEDURE — 83540 ASSAY OF IRON: CPT

## 2020-04-22 PROCEDURE — 82607 VITAMIN B-12: CPT

## 2020-04-24 LAB — VIT B1 BLD-SCNC: 128.3 NMOL/L (ref 66.5–200)

## 2020-05-06 ENCOUNTER — DOCUMENTATION ONLY (OUTPATIENT)
Dept: SURGERY | Age: 35
End: 2020-05-06

## 2020-05-06 ENCOUNTER — VIRTUAL VISIT (OUTPATIENT)
Dept: SURGERY | Age: 35
End: 2020-05-06

## 2020-05-06 VITALS — WEIGHT: 168.4 LBS | BODY MASS INDEX: 26.43 KG/M2 | HEIGHT: 67 IN

## 2020-05-06 DIAGNOSIS — K91.2 POSTSURGICAL MALABSORPTION: Primary | ICD-10-CM

## 2020-05-06 DIAGNOSIS — Z98.84 HISTORY OF ROUX-EN-Y GASTRIC BYPASS: ICD-10-CM

## 2020-05-06 NOTE — PROGRESS NOTES
Consent:  Sheyla Barreto Briana Astudillo and/or her healthcare decision maker is aware that this patient-initiated Telehealth encounter is a billable service, with coverage as determined by her insurance carrier. She is aware that she may receive a bill and has provided verbal consent to proceed: Yes    Provider location while conducting visit: in the office  Patient location: Home  Other person participating in the telehealth services: Lincoln Summers    This virtual visit was conducted via interactive/real-time audio/video using [email or web address]   Visit start: 0907  Visit end: 0930          Bariatric Postoperative Progress Note  David Brothers is a 29 y.o. female is now 1 years status post laparoscopic gastric bypass surgery who was seen by synchronous (real-time) audio-video technology on 5/6/2020. Doing well overall. Currently on a stage 4 diet without difficulty. Taking in 40-60 oz water,  60 g protein. 30 min of activity 4 days a week. Bowel movements are regular. Patient is c/o of episodes of being dizzy on waking and \"blacked out\" about a month ago. Exclusively happens when waking up on standing. She is compliant with multivitamins, calcium, Vit D and B12 supplements.       Weight Loss Metrics 5/6/2020 5/6/2020 11/21/2019 11/21/2019 8/15/2019 8/15/2019 5/24/2019   Pre op / Initial Wt 295 - 295 - 295 - 295   Today's Wt - 168 lb 6.4 oz - 212 lb - 238 lb 3.2 oz -   BMI - 26.38 kg/m2 - 33.2 kg/m2 - 37.31 kg/m2 -   Ideal Body Wt 140 - 140 - 140 - 140   Excess Body Wt 155 - 155 - 155 - 155   Goal Wt - - 171 - 171 - 171   Wt loss to date 126.6 - - - 56.8 - 23   % Wt Loss 1.02 - - - 0.46 - 0.19   80%  - 124 - 124 - 124     Comorbidities:  Hypertension: not applicable  Diabetes: not applicable  Obstructive Sleep Apnea: not applicable  Hyperlipidemia: not applicable  Stress Urinary Incontinence: not applicable  Gastroesophageal Reflux: not applicable  Weight related arthropathy:not applicable    Past Medical History:   Diagnosis Date  Asthma     [facility]lactinemia ([facility] [address]     Hypothyroidism     Vitamin D deficiency        Past Surgical History:   Procedure Laterality Date    HX GI  05/08/2019    gastric bypass       Current Outpatient Medications   Medication Sig Dispense Refill    ascorbic acid, vitamin C, (VITAMIN C) 500 mg tablet Take  by mouth.  ferrous sulfate (IRON) 325 mg (65 mg iron) tablet Take  by mouth Daily (before breakfast).  calcium citrate-vitamin D3 (CITRACAL WITH VITAMIN D MAXIMUM) tablet Take 1 Tab by mouth three (3) times daily. Indications: 500mg each      cyanocobalamin (VITAMIN B-12) 1,000 mcg tablet Take 1,000 mcg by mouth daily.  thiamine HCL (VITAMIN B-1) 100 mg tablet Take  by mouth daily.  multivitamin (ONE A DAY) tablet Take 1 Tab by mouth two (2) times a day. Indications: flintstones      CHOLECALCIFEROL, VITAMIN D3, (VITAMIN D3 PO) Take 5,000 Units by mouth. No Known Allergies    ROS:  Review of Systems   Neurological: Positive for dizziness. When standing in AM    All other systems reviewed and are negative. Physicial Exam:  Visit Vitals  Ht 5' 7\" (1.702 m)   Wt 76.4 kg (168 lb 6.4 oz)   BMI 26.38 kg/m²    (As obtained by patient/caregiver at home)  Physical Exam  Vitals signs reviewed. Constitutional:       Appearance: She is obese. HENT:      Head: Normocephalic and atraumatic. Pulmonary:      Effort: Pulmonary effort is normal.   Neurological:      Mental Status: She is alert and oriented to person, place, and time.    Psychiatric:         Mood and Affect: Mood and affect normal.         Labs:   Recent Results (from the past 1344 hour(s))   ALBUMIN    Collection Time: 04/22/20  8:28 AM   Result Value Ref Range    Albumin 3.7 3.4 - 5.0 g/dL   CBC WITH AUTOMATED DIFF    Collection Time: 04/22/20  8:28 AM   Result Value Ref Range    WBC 2.9 (L) 4.6 - 13.2 K/uL    RBC 3.74 (L) 4.20 - 5.30 M/uL    HGB 12.0 12.0 - 16.0 g/dL    HCT 36.0 35.0 - 45.0 %    MCV 96.3 74.0 - 97.0 FL    MCH 32.1 24.0 - 34.0 PG    MCHC 33.3 31.0 - 37.0 g/dL    RDW 13.8 11.6 - 14.5 %    PLATELET 147 229 - 175 K/uL    MPV 10.8 9.2 - 11.8 FL    NEUTROPHILS 36 (L) 42 - 75 %    LYMPHOCYTES 55 (H) 20 - 51 %    MONOCYTES 9 2 - 9 %    EOSINOPHILS 0 0 - 5 %    BASOPHILS 0 0 - 3 %    ABS. NEUTROPHILS 1.0 (L) 1.8 - 8.0 K/UL    ABS. LYMPHOCYTES 1.6 0.8 - 3.5 K/UL    ABS. MONOCYTES 0.3 0 - 1.0 K/UL    ABS. EOSINOPHILS 0.0 0.0 - 0.4 K/UL    ABS. BASOPHILS 0.0 0.0 - 0.06 K/UL    DF MANUAL      PLATELET COMMENTS ADEQUATE PLATELETS      RBC COMMENTS NORMOCYTIC, NORMOCHROMIC     IRON    Collection Time: 04/22/20  8:28 AM   Result Value Ref Range    Iron 92 50 - 924 ug/dL   METABOLIC PANEL, BASIC    Collection Time: 04/22/20  8:28 AM   Result Value Ref Range    Sodium 142 136 - 145 mmol/L    Potassium 4.5 3.5 - 5.5 mmol/L    Chloride 108 100 - 111 mmol/L    CO2 26 21 - 32 mmol/L    Anion gap 8 3.0 - 18 mmol/L    Glucose 76 74 - 99 mg/dL    BUN 12 7.0 - 18 MG/DL    Creatinine 0.78 0.6 - 1.3 MG/DL    BUN/Creatinine ratio 15 12 - 20      GFR est AA >60 >60 ml/min/1.73m2    GFR est non-AA >60 >60 ml/min/1.73m2    Calcium 9.0 8.5 - 10.1 MG/DL   VITAMIN B1, WHOLE BLOOD    Collection Time: 04/22/20  8:28 AM   Result Value Ref Range    Vitamin B1 128.3 66.5 - 200.0 nmol/L   VITAMIN B12 & FOLATE    Collection Time: 04/22/20  8:28 AM   Result Value Ref Range    Vitamin B12 >2,000 (H) 211 - 911 pg/mL    Folate >20.0 (H) 3.10 - 17.50 ng/mL   FERRITIN    Collection Time: 04/22/20  8:28 AM   Result Value Ref Range    Ferritin 90 8 - 388 NG/ML   VITAMIN D, 25 HYDROXY    Collection Time: 04/22/20  8:28 AM   Result Value Ref Range    Vitamin D 25-Hydroxy 36.5 30 - 100 ng/mL         Assessment/Plan: Today, Andre Lo is  Height: 5' 7\" (170.2 cm) tall, Weight: 76.4 kg (168 lb 6.4 oz) lbs for a Body mass index is 26.38 kg/m². and are suffering from overweight .   They are currently 1 year s/p laparoscopic gastric bypass surgery with a total weight loss of 126 lbs to date, doing well. Labs were reviewed and pt was instructed to continue MVI/Ca/B12/D3 supplementation. Stressed importance of hydration with SF clear liquids until urine clear. Continue with food content mainly protein veggies. Encouraged support group attendance. Advised exercise program of 150 mins per week. Increase fluids may be experiencing mild dehydration. Ref to PCP or our office for orthostatic BPs, if WNL advise follow with PCP for further eval.     We discussed the expected course, resolution and complications of the diagnosis(es) in detail. Medication risks, benefits, costs, interactions, and alternatives were discussed as indicated. I advised her to contact the office if her condition worsens, changes or fails to improve as anticipated. She expressed understanding with the diagnosis(es) and plan. The primary encounter diagnosis was Postsurgical malabsorption. Diagnoses of History of Adriana-en-Y gastric bypass and BMI 26.0-26.9,adult were also pertinent to this visit. Follow-up and Dispositions    · Return in about 1 year (around 5/6/2021), or if symptoms worsen or fail to improve.     sooner as needed. Services were provided through a video synchronous discussion virtually to substitute for in-person clinic visit. Pursuant to the emergency declaration under the [facility] waiver authority and the [employer] Act, this Virtual  Visit was conducted, with patient's consent, to reduce the patient's risk of exposure to COVID-19 and provide continuity of care for an established patient.     Caridad Kang NP

## 2021-04-19 ENCOUNTER — DOCUMENTATION ONLY (OUTPATIENT)
Dept: SURGERY | Age: 36
End: 2021-04-19

## 2021-04-19 NOTE — PROGRESS NOTES
Per [facility] requirements;  E-mail and letter sent for follow up appointment. [facility] [address]      Dear Patient,    Your health is our main concern. It is important for your health to have follow-up lab work and to see your surgeon at 3 months, 6 months and annually after your weight loss surgery. Additionally, the Department of bariatric Surgery at our hospital is a member of the [employer] [address] Surgical Quality Improvement Program ([facility] NSQIP). As a participant in this program, we gather information on the outcomes of our patients after surgery. Please call the office for a follow up appointment at [phone] [facility]) or [phone] [facility]). If you have moved out of the area or have changed surgeons please call us and let us know the name of your doctor. Your health and feedback are important to us. We greatly appreciate your response.        Thank you,  [facility] [address] [facility]

## 2021-04-27 DIAGNOSIS — K91.2 POSTOPERATIVE MALABSORPTION: Primary | ICD-10-CM

## 2021-05-10 ENCOUNTER — HOSPITAL ENCOUNTER (OUTPATIENT)
Dept: LAB | Age: 36
Discharge: HOME | End: 2021-05-10

## 2021-05-10 LAB — SENTARA SPECIMEN COL,SENBCF: NORMAL

## 2021-05-10 PROCEDURE — 99001 SPECIMEN HANDLING PT-LAB: CPT

## 2021-05-11 LAB
25(OH)D3 SERPL-MCNC: 36 NG/ML (ref 32–100)
ABSOLUTE LYMPHOCYTE COUNT, 10803: 1.7 K/UL (ref 1–4.8)
ALBUMIN SERPL-MCNC: 4.5 G/DL (ref 3.5–5)
ANION GAP SERPL CALC-SCNC: 10 MMOL/L (ref 3–15)
BASOPHILS # BLD: 0 K/UL (ref 0–0.2)
BASOPHILS NFR BLD: 1 % (ref 0–2)
BUN SERPL-MCNC: 15 MG/DL (ref 6–22)
CALCIUM SERPL-MCNC: 9.3 MG/DL (ref 8.4–10.5)
CHLORIDE SERPL-SCNC: 101 MMOL/L (ref 98–110)
CO2 SERPL-SCNC: 27 MMOL/L (ref 20–32)
CREAT SERPL-MCNC: 0.8 MG/DL (ref 0.5–1.2)
EOSINOPHIL # BLD: 0 K/UL (ref 0–0.5)
EOSINOPHIL NFR BLD: 1 % (ref 0–6)
ERYTHROCYTE [DISTWIDTH] IN BLOOD BY AUTOMATED COUNT: 13.4 % (ref 10–15.5)
FE % SATURATION,PSAT: 30 % (ref 20–50)
FERRITIN SERPL-MCNC: 74 NG/ML (ref 10–291)
FOLATE,FOL: >20 NG/ML
GFRAA, 66117: >60
GFRNA, 66118: >60
GLUCOSE SERPL-MCNC: 83 MG/DL (ref 70–99)
GRANULOCYTES,GRANS: 36 % (ref 40–75)
HCT VFR BLD AUTO: 40.1 % (ref 35.1–46.5)
HGB BLD-MCNC: 12.6 G/DL (ref 11.7–15.5)
IRON,IRN: 95 MCG/DL (ref 30–160)
LYMPHOCYTES, LYMLT: 51 % (ref 20–45)
MCH RBC QN AUTO: 33 PG (ref 26–34)
MCHC RBC AUTO-ENTMCNC: 31 G/DL (ref 31–36)
MCV RBC AUTO: 104 FL (ref 81–99)
MONOCYTES # BLD: 0.4 K/UL (ref 0.1–1)
MONOCYTES NFR BLD: 12 % (ref 3–12)
NEUTROPHILS # BLD AUTO: 1.2 K/UL (ref 1.8–7.7)
PLATELET # BLD AUTO: 218 K/UL (ref 140–440)
PMV BLD AUTO: 10.6 FL (ref 9–13)
POTASSIUM SERPL-SCNC: 3.7 MMOL/L (ref 3.5–5.5)
RBC # BLD AUTO: 3.86 M/UL (ref 3.8–5.2)
SODIUM SERPL-SCNC: 138 MMOL/L (ref 133–145)
TIBC,TIBC: 322 MCG/DL (ref 228–428)
UIBC SERPL-MCNC: 227 MCG/DL (ref 110–370)
VIT B12 SERPL-MCNC: 912 PG/ML (ref 211–911)
WBC # BLD AUTO: 3.4 K/UL (ref 4–11)

## 2021-05-17 LAB — VITAMIN B1, WHOLE BLOOD, 66250: 147 NMOL/L (ref 66.5–200)

## 2021-05-24 ENCOUNTER — OFFICE VISIT (OUTPATIENT)
Dept: SURGERY | Age: 36
End: 2021-05-24
Payer: COMMERCIAL

## 2021-05-24 VITALS
BODY MASS INDEX: 24.96 KG/M2 | OXYGEN SATURATION: 100 % | TEMPERATURE: 97.5 F | WEIGHT: 159 LBS | HEIGHT: 67 IN | DIASTOLIC BLOOD PRESSURE: 76 MMHG | HEART RATE: 60 BPM | SYSTOLIC BLOOD PRESSURE: 110 MMHG

## 2021-05-24 DIAGNOSIS — Z98.84 HISTORY OF ROUX-EN-Y GASTRIC BYPASS: ICD-10-CM

## 2021-05-24 DIAGNOSIS — K91.2 POSTSURGICAL MALABSORPTION: Primary | ICD-10-CM

## 2021-05-24 DIAGNOSIS — Z86.39 HX OF OBESITY: ICD-10-CM

## 2021-05-24 PROCEDURE — 99213 OFFICE O/P EST LOW 20 MIN: CPT | Performed by: NURSE PRACTITIONER

## 2021-05-24 RX ORDER — METHYLPREDNISOLONE 4 MG/1
TABLET ORAL
COMMUNITY
Start: 2021-05-10 | End: 2022-05-23

## 2021-05-24 RX ORDER — METHOCARBAMOL 750 MG/1
TABLET, FILM COATED ORAL
COMMUNITY
Start: 2021-05-10 | End: 2022-05-23

## 2021-05-24 RX ORDER — FAMOTIDINE 20 MG/1
TABLET, FILM COATED ORAL
COMMUNITY
Start: 2021-05-10 | End: 2022-05-23

## 2021-05-24 NOTE — PROGRESS NOTES
Bariatric Postoperative Progress Note    Doug Delarosa is a 28 y.o. female is now 2 years status post laparoscopic gastric bypass surgery. Doing well overall. Currently on a stage 4 diet without difficulty. Taking in 48 oz water,  60g protein. 0 min of activity 7 days a week. Bowel movements are constipated-- every few days. Patient reporting back pain. She is compliant with multivitamins, calcium, Vit D and B12 supplements. Weight Loss Metrics 5/24/2021 5/24/2021 5/6/2020 5/6/2020 11/21/2019 11/21/2019 8/15/2019   Pre op / Initial Wt 295 - 295 - 295 - 295   Today's Wt - 159 lb - 168 lb 6.4 oz - 212 lb -   BMI - 24.9 kg/m2 - 26.38 kg/m2 - 33.2 kg/m2 -   Ideal Body Wt 140 - 140 - 140 - 140   Excess Body Wt 155 - 155 - 155 - 155   Goal Wt 171 - - - 171 - 171   Wt loss to date 136 - 126.6 - - - 56.8   % Wt Loss 1.1 - 1.02 - - - 0.46   80%  - 124 - 124 - 124     Comorbidities:  Hypertension: not applicable  Diabetes: not applicable  Obstructive Sleep Apnea: not applicable  Hyperlipidemia: not applicable  Stress Urinary Incontinence: not applicable  Gastroesophageal Reflux: not applicable  Weight related arthropathy:not applicable     Past Medical History:   Diagnosis Date    Asthma     Hyperprolactinemia (HCC)     Hypothyroidism     Vitamin D deficiency        Past Surgical History:   Procedure Laterality Date    HX GI  05/08/2019    gastric bypass     Current Outpatient Medications   Medication Sig Dispense Refill    methylPREDNISolone (MEDROL DOSEPACK) 4 mg tablet       famotidine (PEPCID) 20 mg tablet       methocarbamoL (ROBAXIN) 750 mg tablet       ascorbic acid, vitamin C, (VITAMIN C) 500 mg tablet Take  by mouth.  ferrous sulfate (IRON) 325 mg (65 mg iron) tablet Take  by mouth Daily (before breakfast).  calcium citrate-vitamin D3 (CITRACAL WITH VITAMIN D MAXIMUM) tablet Take 1 Tab by mouth three (3) times daily.  Indications: 500mg each      cyanocobalamin (VITAMIN B-12) 1,000 mcg tablet Take 1,000 mcg by mouth daily.  thiamine HCL (VITAMIN B-1) 100 mg tablet Take  by mouth daily.  multivitamin (ONE A DAY) tablet Take 1 Tab by mouth two (2) times a day. Indications: flintstones      CHOLECALCIFEROL, VITAMIN D3, (VITAMIN D3 PO) Take 5,000 Units by mouth. No Known Allergies    ROS:  Review of Systems   Constitutional: Positive for malaise/fatigue and weight loss. Gastrointestinal: Positive for constipation. Neurological: Positive for dizziness and weakness. All other systems reviewed and are negative. Physicial Exam:  Visit Vitals  /76 (BP 1 Location: Right arm, BP Patient Position: Sitting)   Pulse 60   Temp 97.5 °F (36.4 °C)   Ht 5' 7\" (1.702 m)   Wt 72.1 kg (159 lb)   SpO2 100%   BMI 24.90 kg/m²     Physical Exam  Vitals and nursing note reviewed. HENT:      Head: Normocephalic and atraumatic. Cardiovascular:      Rate and Rhythm: Normal rate. Pulmonary:      Effort: Pulmonary effort is normal.   Abdominal:      General: There is no distension. Palpations: Abdomen is soft. Tenderness: There is no abdominal tenderness. Musculoskeletal:         General: Normal range of motion. Skin:     General: Skin is warm and dry. Neurological:      Mental Status: She is alert and oriented to person, place, and time. Psychiatric:         Mood and Affect: Affect normal.         Labs:   Recent Results (from the past 672 hour(s))   VITAMIN B1, WHOLE BLOOD    Collection Time: 05/10/21 10:35 AM   Result Value Ref Range    VITAMIN B1, WHOLE BLOOD 147.0 66.5 - 200.0 nmol/L   [facility] SPECIMEN COLLN.     Collection Time: 05/10/21 10:39 AM   Result Value Ref Range    [facility] SPECIMEN COL Specimens collected/sent to [facility]     METABOLIC PANEL, BASIC    Collection Time: 05/10/21 10:53 AM   Result Value Ref Range    Glucose 83 70 - 99 mg/dL    BUN 15 6 - 22 mg/dL    Creatinine 0.8 0.5 - 1.2 mg/dL    Sodium 138 133 - 145 mmol/L    Potassium 3.7 3.5 - 5.5 mmol/L    Chloride 101 98 - 110 mmol/L    CO2 27 20 - 32 mmol/L    Calcium 9.3 8.4 - 10.5 mg/dL    Anion gap 10.0 3.0 - 15.0 mmol/L    GFRAA >60.0 >60.0    GFRNA >60.0 >60.0   IRON PROFILE    Collection Time: 05/10/21 10:53 AM   Result Value Ref Range    Iron 95 30 - 160 mcg/dL    UIBC 227 110 - 370 mcg/dL    TIBC 322 228 - 428 mcg/dL    Iron % saturation 30 20 - 50 %   VITAMIN B12 & FOLATE    Collection Time: 05/10/21 10:53 AM   Result Value Ref Range    Vitamin B12 912 (H) 211 - 911 pg/mL    Folate >20.00 >=3.10 ng/mL   VITAMIN D, 25 HYDROXY    Collection Time: 05/10/21 10:53 AM   Result Value Ref Range    VITAMIN D, 25-HYDROXY 36.0 32.0 - 100.0 ng/mL   FERRITIN    Collection Time: 05/10/21 10:53 AM   Result Value Ref Range    Ferritin 74 10 - 291 ng/mL   CBC WITH AUTOMATED DIFF    Collection Time: 05/10/21 10:53 AM   Result Value Ref Range    WBC 3.4 (L) 4.0 - 11.0 K/uL    RBC 3.86 3.80 - 5.20 M/uL    HGB 12.6 11.7 - 15.5 g/dL    HCT 40.1 35.1 - 46.5 %     (H) 81 - 99 fL    MCH 33 26 - 34 pg    MCHC 31 31 - 36 g/dL    RDW 13.4 10.0 - 15.5 %    PLATELET 572 924 - 502 K/uL    MPV 10.6 9.0 - 13.0 fL    NEUTROPHILS 36 (L) 40 - 75 %    Lymphocytes 51 (H) 20 - 45 %    MONOCYTES 12 3 - 12 %    EOSINOPHILS 1 0 - 6 %    BASOPHILS 1 0 - 2 %    ABS. NEUTROPHILS 1.2 (L) 1.8 - 7.7 K/uL    ABSOLUTE LYMPHOCYTE COUNT 1.7 1.0 - 4.8 K/uL    ABS. MONOCYTES 0.4 0.1 - 1.0 K/uL    ABS. EOSINOPHILS 0.0 0.0 - 0.5 K/uL    ABS. BASOPHILS 0.0 0.0 - 0.2 K/uL   ALBUMIN    Collection Time: 05/10/21 10:53 AM   Result Value Ref Range    Albumin 4.5 3.5 - 5.0 g/dL       Assessment/Plan: Today, Pete Moore is  Height: 5' 7\" (170.2 cm) tall, Weight: 72.1 kg (159 lb) lbs for a Body mass index is 24.9 kg/m². They are currently 2 years s/p laparoscopic gastric bypass surgery with a total weight loss of 136lbs , doing well. Labs were reviewed and pt was instructed to continue MVI/Ca/B12/D3 supplementation.     Lengthy disc re: CHO metabolism after GBP and risk of dangerous reactive hypoglycemia--urged pt to avoid CHO foods    We have reviewed the components of a successful postoperative course including requirement for a high protein, low carbohydrate diet, 64 oz a day of zero calorie liquids, daily vitamin supplementation, daily exercise (150 mis/week), regular follow-up, and participation in support groups. Refer to registered dietitian for more detailed medical nutrition therapy as needed. The primary encounter diagnosis was Postsurgical malabsorption. Diagnoses of History of Adriana-en-Y gastric bypass, BMI 24.0-24.9, adult, and Hx of obesity were also pertinent to this visit. Follow-up and Dispositions    · Return in about 1 year (around 5/24/2022), or if symptoms worsen or fail to improve. with labs, sooner as needed.   Tamara Velasquez NP

## 2021-05-24 NOTE — PATIENT INSTRUCTIONS
Zully Jay RD,-- Bariatric Dietitian at [email or web address] / Alexis Purdy RD -- Bariatric Dietitian at [facility]  [phone] / [email or web address]      For starts make sure   - Fluid intake to >64 oz daily  - Eat Plenty of Fiber (at least 20 grams a day)  - Physical activity     -If Above fails: Stool Softener (usually produces a bowel movement in 72 hr):    Example:                   Colace 100mg by mouth BID                       -If pt still has constipation: Stimulant Laxative to the Stool Softener (this should produce a bowel movement in 6 - 12 hr):   Examples:                    Exlax (1 small square) for up to 4 days                    Dulcolax stimulant laxative                    Miralax 1 capful twice a day (It's OK to go up to 3 caps)    *A fleet's enema may also be used with this step. *          - If constipation persists after the above steps advise Liquid Magnesium Citrate (take according to the Directions). Blood Sugar Fluctuations After Gastric Bypass  Reactive Hypoglycemia  It is important to understand the unique changes that occur in your gastrointestinal tract after gastric bypass with regard to glucose absorption. Glucose is the basic unit of sugar and makes up nearly all carbohydrate foods (sweets, including candy and cookies; starches, including crackers and pretzels; and fruits). The anatomic changes in rerouting the stomach and intestines allows for rapid absorption of glucose into the bloodstream. This is a multistep process as follows:  1. Carbohydrate foods are chewed in the mouth (chips, crackers, etc...) and are exposed to an enzyme or chemical in the saliva (spit) that breaks it down to glucose--prior to swallowing. 2. Glucose/saliva mix is swallowed and spends very little time in the small gastric pouch as this solution is liquid/soft and not dense, therefore passes easily into the small intestine connection  3.  Once in the small intestine, approx. 5-10 minutes after swallowing, the glucose is rapidly absorbed in the bloodstream and blood sugar rises to above normal levels (up to 200's in some cases). Most patients report no adverse feelings or symptoms from elevated blood sugar at this point. 4. After approximately 1-2 hours, the pancreas (organ responsible for regulating blood sugars) releases insulin (hormone that adjusts blood sugar) into the bloodstream in order to bring down the high blood sugar level. However, because of the delay in timing and the anatomic changes from the surgery, often the pancreas releases too much insulin, causing blood sugar to drop below (often quite below) normal levels (as low as 30's-40's). 5. When blood sugar drops to low levels (hypoglycemia), patients experience a range of symptoms including; significant and sudden fatigue, sweating, shaking, dizziness, anxiety, hunger, and possible fainting or loss of consciousness. There is often variability in the number and severity of these symptoms. 6. When in a low blood sugar state, the patient often feels that they must ingest high carbohydrate foods or drinks in order to rapidly bring blood sugar back to normal. Unfortunately, doing this restarts the high blood sugar--low blood sugar process all over again (see steps 1-5 above). 7. Repeatedly experiencing this process, called reactive hypoglycemia often leads to weight regain, chronic fatigue, and could cause unexpected loss of consciousness, resulting in injury to the patient or others (especially if occurs while driving). 8. The most effective way to avoid experiencing reactive hypoglycemia and its serious consequences is to avoid carbohydrate foods in the diet. Sticking to the advised meal plan of proteins, vegetables, and healthy fats will keep blood sugar levels stable and assist with long term weight maintenance and prevention of obesity disease relapse.     Recent Results (from the past 672 hour(s))   VITAMIN B1, WHOLE BLOOD    Collection Time: 05/10/21 10:35 AM   Result Value Ref Range    VITAMIN B1, WHOLE BLOOD 147.0 66.5 - 200.0 nmol/L   [facility] Collection Time: 05/10/21 10:39 AM   Result Value Ref Range    [facility]     METABOLIC PANEL, BASIC    Collection Time: 05/10/21 10:53 AM   Result Value Ref Range    Glucose 83 70 - 99 mg/dL    BUN 15 6 - 22 mg/dL    Creatinine 0.8 0.5 - 1.2 mg/dL    Sodium 138 133 - 145 mmol/L    Potassium 3.7 3.5 - 5.5 mmol/L    Chloride 101 98 - 110 mmol/L    CO2 27 20 - 32 mmol/L    Calcium 9.3 8.4 - 10.5 mg/dL    Anion gap 10.0 3.0 - 15.0 mmol/L    GFRAA >60.0 >60.0    GFRNA >60.0 >60.0   IRON PROFILE    Collection Time: 05/10/21 10:53 AM   Result Value Ref Range    Iron 95 30 - 160 mcg/dL    UIBC 227 110 - 370 mcg/dL    TIBC 322 228 - 428 mcg/dL    Iron % saturation 30 20 - 50 %   VITAMIN B12 & FOLATE    Collection Time: 05/10/21 10:53 AM   Result Value Ref Range    Vitamin B12 912 (H) 211 - 911 pg/mL    Folate >20.00 >=3.10 ng/mL   VITAMIN D, 25 HYDROXY    Collection Time: 05/10/21 10:53 AM   Result Value Ref Range    VITAMIN D, 25-HYDROXY 36.0 32.0 - 100.0 ng/mL   FERRITIN    Collection Time: 05/10/21 10:53 AM   Result Value Ref Range    Ferritin 74 10 - 291 ng/mL   CBC WITH AUTOMATED DIFF    Collection Time: 05/10/21 10:53 AM   Result Value Ref Range    WBC 3.4 (L) 4.0 - 11.0 K/uL    RBC 3.86 3.80 - 5.20 M/uL    HGB 12.6 11.7 - 15.5 g/dL    HCT 40.1 35.1 - 46.5 %     (H) 81 - 99 fL    MCH 33 26 - 34 pg    MCHC 31 31 - 36 g/dL    RDW 13.4 10.0 - 15.5 %    PLATELET 288 726 - 618 K/uL    MPV 10.6 9.0 - 13.0 fL    NEUTROPHILS 36 (L) 40 - 75 %    Lymphocytes 51 (H) 20 - 45 %    MONOCYTES 12 3 - 12 %    EOSINOPHILS 1 0 - 6 %    BASOPHILS 1 0 - 2 %    ABS. NEUTROPHILS 1.2 (L) 1.8 - 7.7 K/uL    ABSOLUTE LYMPHOCYTE COUNT 1.7 1.0 - 4.8 K/uL    ABS. MONOCYTES 0.4 0.1 - 1.0 K/uL    ABS.  EOSINOPHILS 0.0 0.0 - 0.5 K/uL    ABS.  BASOPHILS 0.0 0.0 - 0.2 K/uL   ALBUMIN    Collection Time: 05/10/21 10:53 AM   Result Value Ref Range    Albumin 4.5 3.5 - 5.0 g/dL         [facility] Page 1 of 1   Client Code:  9165         Req/Control #: [id number]       Collection Date:   Bill Insurance Unless Marked - [id number]       Collection Time:   Client Bill [ ] - [id number]       Collected By:    Self Pay [ ] - [id number]              Client / Ordering Site Information: Physician Information:   Location: [facility] ([id number])   Address: [address] [place] [address] Zip: [address]   Phone (Fax): [phone] ([phone])    Ordering: Fritz Mason   Title: NP   NPI: [id number]   UPIN: Not on file   Physician ID:           Patient Information:   Name: Simon Mercado   Gender: Female   YOB: 1985   Age: 28 years   Address: [address] [place] [address] Zip: [address],[facility] [address]    SSN: [id number]   Patient ID: [id number]   Phone: [phone]       Alt Control#: [id number]   Alt Patient ID:              CPT CODE TESTS ORDERED (Total: 8) PRIORITY ORDERED EXPECTED EXPIRES TUBE CAP          [ ] BCF - Add Blood Collection Fee        [id number] ALBUMIN Routine 5/24/2021 5/24/2021 5/25/2022 Not on File   06704 1007 CBC WITH AUTOMATED DIFF Routine 5/24/2021 5/24/2021 5/25/2022 Not on File   20490 4085 FERRITIN Routine 5/24/2021 5/24/2021 5/25/2022 Not on File   [id number] 7712 IRON Routine 5/24/2021 5/24/2021 5/25/2022 Not on File   60142 9228 METABOLIC PANEL, BASIC Routine 5/24/2021 5/24/2021 5/25/2022 Not on File   35680 5026 VITAMIN B1, WHOLE BLOOD Routine 5/24/2021 5/24/2021 5/25/2022 Not on File   [id number] VITAMIN B12 & FOLATE Routine 5/24/2021 5/24/2021 5/25/2022 Not on File   [id number] 8713 VITAMIN D, 25 HYDROXY Routine 5/24/2021 5/24/2021 5/25/2022 Not on File         Comments:         Specimen Source:   ([id number]) VITAMIN B1, WHOLE BLOOD:   Blood Diagnosis Codes:   K91.2 Z98.84 Z68.24 Z86.39           Bill Type:      Ins Code:           Responsible Party / Guarantor Information:   Name: Kane Rosenberg   Address: [address] [place] [address] Zip: [place] [address] [place] [address],[facility] [address]   Phone: [phone]   Relation to Pt: Self   Employer Name: [employer]:      Worker's Comp: N Date of Injury:              Insurance Information:   Primary Insurance: Secondary Insurance:   Ins Co Name:     Address 1:     Address 2: [address], [place] Zip:     Policy Number:     Group #:      Ins Co Name:     Address 1:     Address 2: [address], [place] Zip:     Policy Number:     Group #:        Primary Policy Cassidy / Mardee Ou: [address] / M[address]ee Ou:   Name:     Address:          Pt Relation to Subscriber:      Name:     Address:          Pt Relation to Subscriber:           Electronically Signed By:   [id number]  Yadira Velazquez  [id number]        WAGNER ECHEVARRIA S   1985 [id number]    Nakita Mart S   [phone]    Nakita Mart S   [id number]    Kane Hansontiny   1985 [id number]      Nakita Mart S   [id number]    LUPISNLFQIF S   1985 [id number]    KEVINKQDUHF S   1985 [id number]    CHRISTA RIZVI S   1985 [phone]

## 2021-06-03 ENCOUNTER — HOSPITAL ENCOUNTER (OUTPATIENT)
Dept: BARIATRICS/WEIGHT MGMT | Age: 36
Discharge: HOME | End: 2021-06-03

## 2021-06-03 NOTE — PROGRESS NOTES
6/3/2021: Patient is 2 years post op and reports she is doing well w/ weight loss. She has lost  142 lbs to date, currently weight at 153 lbs w/ a goal weight of 145 lbs that she has set for herself. Current BMI: 23.9  Patient reports that she is currently doing HIIT workouts 5x/week. She had a back injury at one point and had to stop exercise for 2 weeks, but she is getting back to it and taking it a bit easier this time and introducing more weight slowly. Patient did report that she is feeling hungry more often recently, which have have a correlation with her increase in activity, thus needing an increase in protein. She reports she is eating 3 meals daily, protein-based, as well as 1 protein shake daily (premier). Pt has tried protein bars, however, we discussed how they may have too many added sugars/sugar alcohols, which are not optimal to choose post op. Meals include chicken breast, seafood, occasional beef, turkey bright, ground turkey for protein options, and will add a small portion of potatoes/other starchy veggies from time to time. She has not tried pasta, and said that rice was too painful, so he has been staying away from grains altogether. Patient did report she has a tough time getting in all her fluids, drinking usually 48 oz water, 12 oz protein shake, and 2-3 oz of coffee mixed w/ water. We discussed the importance of increasing fluids and fiber to help alleviate constipation, as well as increasing protein and fiber to help w/ satiety and ensuring she is getting enough energy to keep up with her exercise regimen. Advised patient to increase protein at meals, and either continue w/ protein shake or add in protein based snacks to total 30-40g extra for the day. Patient reports she is taking her vitamins regularly, and has recently gotten her labs checked, with everything WNL. She is taking Vit C w/ her iron prior to bed and is following along with her regimen well.      Instructed patient to give me a call if any issues or questions present. My phone number was provided.      Martin Garner RD

## 2022-02-07 PROBLEM — Z34.90 TERM PREGNANCY: Status: ACTIVE | Noted: 2022-02-07

## 2022-03-18 PROBLEM — E55.9 HYPOVITAMINOSIS D: Status: ACTIVE | Noted: 2018-10-12

## 2022-03-19 PROBLEM — Z37.9 NORMAL LABOR: Status: ACTIVE | Noted: 2018-02-27

## 2022-03-19 PROBLEM — Z34.90 TERM PREGNANCY: Status: ACTIVE | Noted: 2022-02-07

## 2022-03-20 PROBLEM — E66.01 MORBID OBESITY (HCC): Status: ACTIVE | Noted: 2018-10-12

## 2022-04-04 ENCOUNTER — DOCUMENTATION ONLY (OUTPATIENT)
Dept: SURGERY | Age: 37
End: 2022-04-04

## 2022-04-04 NOTE — PROGRESS NOTES
Per [facility] requirements;  E-mail and letter sent for follow up appointment. [employer] [facility] [place] [address]      Dear Patient,    Your health is our main concern. It is important for your health to have follow-up lab work and to see your surgeon at 3 months, 6 months and annually after your weight loss surgery. Additionally, the Department of bariatric Surgery at our hospital is a member of the Metabolic and Bariatric Surgery Accreditation and Quality Improvement Program [facility]). As a participant in this program, we gather information on the outcomes of our patients after surgery. Please call the office for a follow up appointment at [phone] [facility]) or [phone] [facility]). If you have moved out of the area or have changed surgeons please call us and let us know the name of your doctor. Your health and feedback are important to us. We greatly appreciate your response.        Thank you,  [employer] [place] [facility] [address] [facility]

## 2022-04-27 DIAGNOSIS — K91.2 POSTSURGICAL MALABSORPTION: Primary | ICD-10-CM

## 2022-05-02 ENCOUNTER — HOSPITAL ENCOUNTER (OUTPATIENT)
Dept: LAB | Age: 37
Discharge: HOME | End: 2022-05-02

## 2022-05-02 LAB — SENTARA SPECIMEN COL,SENBCF: NORMAL

## 2022-05-02 PROCEDURE — 99001 SPECIMEN HANDLING PT-LAB: CPT

## 2022-05-03 LAB
25(OH)D3 SERPL-MCNC: 28.2 NG/ML (ref 32–100)
A-G RATIO,AGRAT: 1.5 RATIO (ref 1.1–2.6)
ABSOLUTE LYMPHOCYTE COUNT, 10803: 1.6 K/UL (ref 1–4.8)
ALBUMIN SERPL-MCNC: 3.7 G/DL (ref 3.5–5)
ALP SERPL-CCNC: 71 U/L (ref 25–115)
ALT SERPL-CCNC: 21 U/L (ref 5–40)
ANION GAP SERPL CALC-SCNC: 9 MMOL/L (ref 3–15)
AST SERPL W P-5'-P-CCNC: 18 U/L (ref 10–37)
BASOPHILS # BLD: 0 K/UL (ref 0–0.2)
BASOPHILS NFR BLD: 1 % (ref 0–2)
BILIRUB SERPL-MCNC: 0.4 MG/DL (ref 0.2–1.2)
BUN SERPL-MCNC: 15 MG/DL (ref 6–22)
CALCIUM SERPL-MCNC: 8.9 MG/DL (ref 8.4–10.5)
CHLORIDE SERPL-SCNC: 105 MMOL/L (ref 98–110)
CO2 SERPL-SCNC: 26 MMOL/L (ref 20–32)
CREAT SERPL-MCNC: 0.8 MG/DL (ref 0.5–1.2)
EOSINOPHIL # BLD: 0.1 K/UL (ref 0–0.5)
EOSINOPHIL NFR BLD: 2 % (ref 0–6)
ERYTHROCYTE [DISTWIDTH] IN BLOOD BY AUTOMATED COUNT: 14.2 % (ref 10–15.5)
FERRITIN SERPL-MCNC: 75 NG/ML (ref 10–291)
FOLATE,FOL: >20 NG/ML
GFRAA, 66117: >60
GFRNA, 66118: >60
GLOBULIN,GLOB: 2.4 G/DL (ref 2–4)
GLUCOSE SERPL-MCNC: 82 MG/DL (ref 70–99)
GRANULOCYTES,GRANS: 35 % (ref 40–75)
HCT VFR BLD AUTO: 38 % (ref 35.1–46.5)
HGB BLD-MCNC: 11.7 G/DL (ref 11.7–15.5)
IRON,IRN: 119 MCG/DL (ref 30–160)
LYMPHOCYTES, LYMLT: 51 % (ref 20–45)
MCH RBC QN AUTO: 31 PG (ref 26–34)
MCHC RBC AUTO-ENTMCNC: 31 G/DL (ref 31–36)
MCV RBC AUTO: 101 FL (ref 80–99)
MONOCYTES # BLD: 0.4 K/UL (ref 0.1–1)
MONOCYTES NFR BLD: 12 % (ref 3–12)
NEUTROPHILS # BLD AUTO: 1.1 K/UL (ref 1.8–7.7)
PLATELET # BLD AUTO: 225 K/UL (ref 140–440)
PMV BLD AUTO: 10.1 FL (ref 9–13)
POTASSIUM SERPL-SCNC: 3.9 MMOL/L (ref 3.5–5.5)
PROT SERPL-MCNC: 6.1 G/DL (ref 6.4–8.3)
RBC # BLD AUTO: 3.76 M/UL (ref 3.8–5.2)
SODIUM SERPL-SCNC: 140 MMOL/L (ref 133–145)
VIT B12 SERPL-MCNC: 927 PG/ML (ref 211–911)
WBC # BLD AUTO: 3.1 K/UL (ref 4–11)

## 2022-05-10 LAB — VITAMIN B1, WHOLE BLOOD, 66250: 114.6 NMOL/L (ref 66.5–200)

## 2022-05-23 ENCOUNTER — OFFICE VISIT (OUTPATIENT)
Dept: SURGERY | Age: 37
End: 2022-05-23
Payer: COMMERCIAL

## 2022-05-23 VITALS
TEMPERATURE: 97 F | BODY MASS INDEX: 27.47 KG/M2 | OXYGEN SATURATION: 99 % | HEIGHT: 67 IN | HEART RATE: 56 BPM | SYSTOLIC BLOOD PRESSURE: 100 MMHG | WEIGHT: 175 LBS | DIASTOLIC BLOOD PRESSURE: 68 MMHG

## 2022-05-23 DIAGNOSIS — Z86.39 HX OF OBESITY: ICD-10-CM

## 2022-05-23 DIAGNOSIS — K91.2 POST-RESECTION MALABSORPTION: Primary | ICD-10-CM

## 2022-05-23 DIAGNOSIS — Z98.84 S/P GASTRIC BYPASS: ICD-10-CM

## 2022-05-23 DIAGNOSIS — E66.3 OVERWEIGHT: ICD-10-CM

## 2022-05-23 PROCEDURE — 99213 OFFICE O/P EST LOW 20 MIN: CPT | Performed by: REGISTERED NURSE

## 2022-05-23 NOTE — PROGRESS NOTES
Bariatric Postoperative Progress Note    Chief Complaint   Patient presents with    Follow-up     Yearly f/u, LGBP 5/8/19       Cornelio Rolle is a 39 y.o. female now 3 years status post laparoscopic gastric bypass surgery performed on 5/8/19. Doing well overall. Recently had a baby girl in Feb 2022 and has questions about maintaining breast milk supply. Currently drinking protein shakes, and eating eggs, chicken, fish, occ red meat, salad, spinach, kale, occ apples, pretzles, peanut butter crackers, bread, potatoes, occ chocolate. Taking in 80+ oz water, unknown g protein. 50 min of activity 1 days a week. Gradually increasing physical activity as tolerated. Bowel movements are regular. She has no complaints of  pain. She is compliant with multivitamins, calcium, Vit D and B12 supplements. Pt exclusively breast feeds. Notices milk supply fluctuates based on water intake. Tried oatmeal to increase milk supply but feels bloated.    B: Eggs, turkey bright, or low carb toast  L: Cold cut (turkey)  S: Pretzels, peanut butter crackers, or nuts  D: Protein, veggies    Weight Loss Metrics 5/23/2022 5/23/2022 2/7/2022 5/24/2021 5/24/2021 5/6/2020 5/6/2020   Pre op / Initial Wt 295 - - 295 - 295 -   Today's Wt - 175 lb 193 lb - 159 lb - 168 lb 6.4 oz   BMI - 27.41 kg/m2 31.15 kg/m2 - 24.9 kg/m2 - 26.38 kg/m2   Ideal Body Wt 140 - - 140 - 140 -   Excess Body Wt 155 - - 155 - 155 -   Goal Wt 171 - - 171 - - -   Wt loss to date 120 - - 136 - 126.6 -   % Wt Loss 0.97 - - 1.1 - 1.02 -   80%  - - 124 - 124 -       Comorbidities:  Hypertension: not applicable  Diabetes: not applicable  Obstructive Sleep Apnea: not applicable  Hyperlipidemia: not applicable  Stress Urinary Incontinence: not applicable  Gastroesophageal Reflux: not applicable  Weight related arthropathy:not applicable        Past Medical History:   Diagnosis Date    Asthma     Hyperprolactinemia (HCC)     Vitamin D deficiency        Past Surgical History:   Procedure Laterality Date    HX GI  05/08/2019    gastric bypass       Current Outpatient Medications   Medication Sig Dispense Refill    PNV Comb #2-Iron-FA-Omega 3 29-1-400 mg cmpk Take  by mouth daily.  ascorbic acid, vitamin C, (VITAMIN C) 500 mg tablet Take  by mouth.  ferrous sulfate (IRON) 325 mg (65 mg iron) tablet Take  by mouth Daily (before breakfast).  calcium citrate-vitamin D3 (CITRACAL WITH VITAMIN D MAXIMUM) tablet Take 1 Tablet by mouth three (3) times daily. Indications: 500mg each      cyanocobalamin (VITAMIN B-12) 1,000 mcg tablet Take 1,000 mcg by mouth daily.  thiamine HCL (VITAMIN B-1) 100 mg tablet Take  by mouth daily.  multivitamin (ONE A DAY) tablet Take 1 Tab by mouth two (2) times a day. Indications: flintstones      CHOLECALCIFEROL, VITAMIN D3, (VITAMIN D3 PO) Take 5,000 Units by mouth. No Known Allergies    ROS:  Review of Systems   Constitutional: Positive for weight loss. Negative for malaise/fatigue. Eyes: Negative for blurred vision. Respiratory: Negative. Cardiovascular: Negative for chest pain and palpitations. Gastrointestinal: Negative for abdominal pain and constipation. Genitourinary: Negative. Physicial Exam:  Visit Vitals  /68 (BP 1 Location: Left upper arm, BP Patient Position: Sitting)   Pulse (!) 56   Temp 97 °F (36.1 °C)   Ht 5' 7\" (1.702 m)   Wt 79.4 kg (175 lb)   SpO2 99%   BMI 27.41 kg/m²     Physical Exam  Vitals reviewed. HENT:      Head: Normocephalic and atraumatic. Cardiovascular:      Rate and Rhythm: Bradycardia present. Pulses: Normal pulses. Heart sounds: Normal heart sounds. Pulmonary:      Effort: Pulmonary effort is normal.      Breath sounds: Normal breath sounds. Musculoskeletal:         General: Normal range of motion. Skin:     General: Skin is warm and dry. Neurological:      Mental Status: She is alert and oriented to person, place, and time. Psychiatric:         Mood and Affect: Mood normal.         Behavior: Behavior normal.         Thought Content: Thought content normal.       Labs:   Recent Results (from the past 672 hour(s))   CBC WITH AUTOMATED DIFF    Collection Time: 05/02/22  7:45 AM   Result Value Ref Range    WBC 3.1 (L) 4.0 - 11.0 K/uL    RBC 3.76 (L) 3.80 - 5.20 M/uL    HGB 11.7 11.7 - 15.5 g/dL    HCT 38.0 35.1 - 46.5 %     (H) 80 - 99 fL    MCH 31 26 - 34 pg    MCHC 31 31 - 36 g/dL    RDW 14.2 10.0 - 15.5 %    PLATELET 579 390 - 715 K/uL    MPV 10.1 9.0 - 13.0 fL    NEUTROPHILS 35 (L) 40 - 75 %    Lymphocytes 51 (H) 20 - 45 %    MONOCYTES 12 3 - 12 %    EOSINOPHILS 2 0 - 6 %    BASOPHILS 1 0 - 2 %    ABS. NEUTROPHILS 1.1 (L) 1.8 - 7.7 K/uL    ABSOLUTE LYMPHOCYTE COUNT 1.6 1.0 - 4.8 K/uL    ABS. MONOCYTES 0.4 0.1 - 1.0 K/uL    ABS. EOSINOPHILS 0.1 0.0 - 0.5 K/uL    ABS. BASOPHILS 0.0 0.0 - 0.2 K/uL   FERRITIN    Collection Time: 05/02/22  7:45 AM   Result Value Ref Range    Ferritin 75 10 - 291 ng/mL   IRON    Collection Time: 05/02/22  7:45 AM   Result Value Ref Range    Iron 119 30 - 072 mcg/dL   METABOLIC PANEL, COMPREHENSIVE    Collection Time: 05/02/22  7:45 AM   Result Value Ref Range    Glucose 82 70 - 99 mg/dL    BUN 15 6 - 22 mg/dL    Creatinine 0.8 0.5 - 1.2 mg/dL    Sodium 140 133 - 145 mmol/L    Potassium 3.9 3.5 - 5.5 mmol/L    Chloride 105 98 - 110 mmol/L    CO2 26 20 - 32 mmol/L    AST (SGOT) 18 10 - 37 U/L    ALT (SGPT) 21 5 - 40 U/L    Alk.  phosphatase 71 25 - 115 U/L    Bilirubin, total 0.4 0.2 - 1.2 mg/dL    Calcium 8.9 8.4 - 10.5 mg/dL    Protein, total 6.1 (L) 6.4 - 8.3 g/dL    Albumin 3.7 3.5 - 5.0 g/dL    A-G Ratio 1.5 1.1 - 2.6 ratio    Globulin 2.4 2.0 - 4.0 g/dL    Anion gap 9.0 3.0 - 15.0 mmol/L    GFRAA >60.0 >60.0    GFRNA >60.0 >60.0   VITAMIN D, 25 HYDROXY    Collection Time: 05/02/22  7:45 AM   Result Value Ref Range    VITAMIN D, 25-HYDROXY 28.2 (L) 32.0 - 100.0 ng/mL   VITAMIN B12 & FOLATE Collection Time: 05/02/22  7:45 AM   Result Value Ref Range    Vitamin B12 927 (H) 211 - 911 pg/mL    Folate >20.00 >=3.10 ng/mL   VITAMIN B1, WHOLE BLOOD    Collection Time: 05/02/22  8:16 AM   Result Value Ref Range    VITAMIN B1, WHOLE BLOOD 114.6 66.5 - 200.0 nmol/L   [facility] SPECIMEN [facility]. Collection Time: 05/02/22  8:58 AM   Result Value Ref Range    [facility] collected/sent to [facility]         Assessment/Plan:   She is currently 3 years s/p laparoscopic gastric bypass surgery with a total weight loss of 120lbs to date. Labs were reviewed and pt was instructed to continue multivitamin/B1/ B12/calcium/vit D supplementation. Noted WBC level of 3.1 on 5/2/2022. Historically, pt WBC tends to fluctuate. Pt denies recent illness or change in medications. Will recheck in 3-6 months. Encouraged pt to follow up with PCP. Discussed high density carbohydrates and their impacts on weight regain, hunger, cravings and reactive hypoglycemia. Advised pt to maintain adequate hydration. Suggested high nutrient meals, complex carbohydrates and fenugreek to assist with milk supply. Reassured pt to focus on maintaining nutrition while breast feeding. Will re-evaluate weight loss goals when breast feeding requirements change. We have reviewed the components of a successful postoperative course including requirement for a high protein, low carbohydrate diet, 64 oz a day of zero calorie liquids, daily vitamin supplementation, daily exercise (150 mins/week), regular follow-up, and participation in support groups. Refer to lactation consultant and registered dietitian for more detailed medical nutrition therapy as needed. The primary encounter diagnosis was Post-resection malabsorption. Diagnoses of S/P gastric bypass, Overweight, and Hx of obesity were also pertinent to this visit. Follow-up and Dispositions    · Return in about 6 months (around 11/23/2022) for weight check .        with labs, sooner as needed.   Sophia Reyes, NP

## 2023-04-04 NOTE — NURSE NAVIGATOR
Per MBSAQIP requirements:  Letter and email sent requesting follow up appointment. [facility] [address]      Dear Patient,    Your health is our main concern. It is important for your health to have follow-up lab work and to see your surgeon at 3 months, 6 months and annually after your weight loss surgery. Additionally, the Department of bariatric Surgery at our hospital is a member of the Metabolic and Bariatric Surgery Accreditation and Quality Improvement Program [facility]). As a participant in this program, we gather information on the outcomes of our patients after surgery. Please call the office for a follow up appointment at [phone] [facility]) or [phone] [facility]). If you have moved out of the area or have changed surgeons please call us and let us know the name of your doctor. Your health and feedback are important to us. We greatly appreciate your response.        Thank you,  [facility] [address] [facility]

## 2023-04-27 ENCOUNTER — TELEPHONE (OUTPATIENT)
Age: 38
End: 2023-04-27

## 2023-04-27 DIAGNOSIS — K91.2 POSTSURGICAL MALABSORPTION, NOT ELSEWHERE CLASSIFIED: Primary | ICD-10-CM

## 2023-05-01 ENCOUNTER — HOSPITAL ENCOUNTER (OUTPATIENT)
Facility: HOSPITAL | Age: 38
Discharge: HOME | End: 2023-05-04

## 2023-05-01 LAB — SENTARA SPECIMEN COLLECTION: NORMAL

## 2023-05-01 PROCEDURE — 99001 SPECIMEN HANDLING PT-LAB: CPT

## 2023-05-02 LAB
A/G RATIO: 1.8 RATIO (ref 1.1–2.6)
ALBUMIN SERPL-MCNC: 4.4 G/DL (ref 3.5–5)
ALP BLD-CCNC: 77 U/L (ref 25–115)
ALT SERPL-CCNC: 14 U/L (ref 5–40)
ANION GAP SERPL CALCULATED.3IONS-SCNC: 10 MMOL/L (ref 3–15)
AST SERPL-CCNC: 16 U/L (ref 10–37)
BASOPHILS # BLD: 1 % (ref 0–2)
BASOPHILS ABSOLUTE: 0 K/UL (ref 0–0.2)
BILIRUB SERPL-MCNC: 0.3 MG/DL (ref 0.2–1.2)
BUN BLDV-MCNC: 16 MG/DL (ref 6–22)
CALCIUM SERPL-MCNC: 9.5 MG/DL (ref 8.4–10.5)
CHLORIDE BLD-SCNC: 106 MMOL/L (ref 98–110)
CO2: 27 MMOL/L (ref 20–32)
CREAT SERPL-MCNC: 0.7 MG/DL (ref 0.5–1.2)
EOSINOPHIL # BLD: 2 % (ref 0–6)
EOSINOPHILS ABSOLUTE: 0 K/UL (ref 0–0.5)
FERRITIN: 34 NG/ML (ref 10–291)
GLOBULIN: 2.4 G/DL (ref 2–4)
GLOMERULAR FILTRATION RATE: >60 ML/MIN/1.73 SQ.M.
GLUCOSE: 59 MG/DL (ref 70–99)
HCT VFR BLD CALC: 37.5 % (ref 35.1–46.5)
HEMOGLOBIN: 11.6 G/DL (ref 11.7–15.5)
IRON: 99 MCG/DL (ref 30–160)
LYMPHOCYTES # BLD: 45 % (ref 20–45)
LYMPHOCYTES ABSOLUTE: 1.1 K/UL (ref 1–4.8)
MCH RBC QN AUTO: 31 PG (ref 26–34)
MCHC RBC AUTO-ENTMCNC: 31 G/DL (ref 31–36)
MCV RBC AUTO: 100 FL (ref 80–99)
MONOCYTES ABSOLUTE: 0.3 K/UL (ref 0.1–1)
MONOCYTES: 11 % (ref 3–12)
NEUTROPHILS ABSOLUTE: 1.1 K/UL (ref 1.8–7.7)
NEUTROPHILS: 42 % (ref 40–75)
PDW BLD-RTO: 14.1 % (ref 10–15.5)
PLATELET # BLD: 203 K/UL (ref 140–440)
PMV BLD AUTO: 10.4 FL (ref 9–13)
POTASSIUM SERPL-SCNC: 4.1 MMOL/L (ref 3.5–5.5)
RBC: 3.74 M/UL (ref 3.8–5.2)
SODIUM BLD-SCNC: 143 MMOL/L (ref 133–145)
TOTAL PROTEIN: 6.8 G/DL (ref 6.4–8.3)
VITAMIN B-12: 401 PG/ML (ref 211–911)
VITAMIN D 25-HYDROXY: 23.2 NG/ML (ref 32–100)
WBC: 2.5 K/UL (ref 4–11)

## 2023-05-06 LAB — THIAMINE BLOOD: 104 NMOL/L (ref 78–185)

## 2023-05-11 ENCOUNTER — OFFICE VISIT (OUTPATIENT)
Age: 38
End: 2023-05-11
Payer: COMMERCIAL

## 2023-05-11 VITALS
HEIGHT: 67 IN | BODY MASS INDEX: 26.37 KG/M2 | HEART RATE: 60 BPM | SYSTOLIC BLOOD PRESSURE: 106 MMHG | OXYGEN SATURATION: 100 % | TEMPERATURE: 98 F | RESPIRATION RATE: 16 BRPM | WEIGHT: 168 LBS | DIASTOLIC BLOOD PRESSURE: 68 MMHG

## 2023-05-11 DIAGNOSIS — K91.2 POST-RESECTION MALABSORPTION: Primary | ICD-10-CM

## 2023-05-11 DIAGNOSIS — K91.2 POST-RESECTION MALABSORPTION: ICD-10-CM

## 2023-05-11 DIAGNOSIS — Z98.84 S/P GASTRIC BYPASS: ICD-10-CM

## 2023-05-11 DIAGNOSIS — Z86.39 HX OF OBESITY: ICD-10-CM

## 2023-05-11 DIAGNOSIS — D72.819 LEUKOPENIA, UNSPECIFIED TYPE: ICD-10-CM

## 2023-05-11 DIAGNOSIS — E55.9 VITAMIN D DEFICIENCY: ICD-10-CM

## 2023-05-11 PROCEDURE — 99214 OFFICE O/P EST MOD 30 MIN: CPT | Performed by: REGISTERED NURSE

## 2023-05-11 RX ORDER — MULTIVIT-MIN/IRON/FOLIC ACID/K 45-800-120
CAPSULE ORAL DAILY
COMMUNITY

## 2023-05-11 ASSESSMENT — ENCOUNTER SYMPTOMS
SHORTNESS OF BREATH: 0
GASTROINTESTINAL NEGATIVE: 1
CHEST TIGHTNESS: 0

## 2023-05-11 NOTE — PROGRESS NOTES
22 mg/dL    Creatinine 0.7 0.5 - 1.2 mg/dL    Sodium 143 133 - 145 mmol/L    Potassium 4.1 3.5 - 5.5 mmol/L    Chloride 106 98 - 110 mmol/L    CO2 27 20 - 32 mmol/L    AST 16 10 - 37 U/L    ALT 14 5 - 40 U/L    Alkaline Phosphatase 77 25 - 115 U/L    Total Bilirubin 0.3 0.2 - 1.2 mg/dL    Calcium 9.5 8.4 - 10.5 mg/dL    Total Protein 6.8 6.4 - 8.3 g/dL    Albumin 4.4 3.5 - 5.0 g/dL    Albumin/Globulin Ratio 1.8 1.1 - 2.6 ratio    Globulin 2.4 2.0 - 4.0 g/dL    Glomerular Filtration Rate >60.0 >60.0 mL/min/1.73 sq.m.     Anion Gap 10.0 3.0 - 15.0 mmol/L   Ferritin    Collection Time: 05/01/23  8:29 AM   Result Value Ref Range    Ferritin 34 10 - 291 ng/mL   Iron    Collection Time: 05/01/23  8:29 AM   Result Value Ref Range    Iron 99 30 - 160 mcg/dL   Vitamin B12    Collection Time: 05/01/23  8:29 AM   Result Value Ref Range    Vitamin B-12 401 211 - 911 pg/mL   Vitamin D 25 Hydroxy    Collection Time: 05/01/23  8:29 AM   Result Value Ref Range    Vit D, 25-Hydroxy 23.2 (L) 32.0 - 100.0 ng/mL   CBC with Auto Differential    Collection Time: 05/01/23  8:29 AM   Result Value Ref Range    WBC 2.5 (L) 4.0 - 11.0 K/uL    RBC 3.74 (L) 3.80 - 5.20 M/uL    Hemoglobin 11.6 (L) 11.7 - 15.5 g/dL    Hematocrit 37.5 35.1 - 46.5 %     (H) 80 - 99 fL    MCH 31 26 - 34 pg    MCHC 31 31 - 36 g/dL    RDW 14.1 10.0 - 15.5 %    Platelets 019 486 - 354 K/uL    MPV 10.4 9.0 - 13.0 fL    Neutrophils 42 40 - 75 %    Lymphocytes 45 20 - 45 %    Monocytes 11 3 - 12 %    Eosinophils 2 0 - 6 %    Basophils 1 0 - 2 %    Neutrophils Absolute 1.1 (L) 1.8 - 7.7 K/uL    Lymphocytes Absolute 1.1 1.0 - 4.8 K/uL    Monocytes Absolute 0.3 0.1 - 1.0 K/uL    Eosinophils Absolute 0.0 0.0 - 0.5 K/uL    Basophils Absolute 0.0 0.0 - 0.2 K/uL   [facility] specimen collection    Collection Time: 05/01/23  8:35 AM   Result Value Ref Range    [facility] Specimen Collection Specimens collected/sent to [facility]         Assessment/Plan:   She is currently 4 years

## 2023-10-17 ENCOUNTER — OFFICE VISIT (OUTPATIENT)
Age: 38
End: 2023-10-17

## 2023-10-17 VITALS
HEART RATE: 66 BPM | HEIGHT: 67 IN | OXYGEN SATURATION: 100 % | TEMPERATURE: 97.7 F | WEIGHT: 179.4 LBS | SYSTOLIC BLOOD PRESSURE: 104 MMHG | BODY MASS INDEX: 28.16 KG/M2 | RESPIRATION RATE: 16 BRPM | DIASTOLIC BLOOD PRESSURE: 66 MMHG

## 2023-10-17 DIAGNOSIS — R63.5 WEIGHT GAIN STATUS POST GASTRIC BYPASS: ICD-10-CM

## 2023-10-17 DIAGNOSIS — D72.819 LEUKOPENIA, UNSPECIFIED TYPE: ICD-10-CM

## 2023-10-17 DIAGNOSIS — E66.3 OVERWEIGHT (BMI 25.0-29.9): ICD-10-CM

## 2023-10-17 DIAGNOSIS — Z71.3 ENCOUNTER FOR WEIGHT LOSS COUNSELING: Primary | ICD-10-CM

## 2023-10-17 DIAGNOSIS — Z86.39 HX OF OBESITY: ICD-10-CM

## 2023-10-17 DIAGNOSIS — Z98.84 WEIGHT GAIN STATUS POST GASTRIC BYPASS: ICD-10-CM

## 2023-10-17 RX ORDER — DIETHYLPROPION HYDROCHLORIDE 75 MG/1
75 TABLET ORAL DAILY
Qty: 30 TABLET | Refills: 0 | Status: SHIPPED | OUTPATIENT
Start: 2023-10-17 | End: 2023-11-16

## 2023-10-17 ASSESSMENT — ENCOUNTER SYMPTOMS
CHEST TIGHTNESS: 0
GASTROINTESTINAL NEGATIVE: 1
SHORTNESS OF BREATH: 0

## 2023-10-17 NOTE — PROGRESS NOTES
Bariatric Postoperative Progress Note    Chief Complaint   Patient presents with    Weight Loss     LGBP 5/8/2019     Kevin Brewer is a 45 y.o. female now 4 years status post laparoscopic gastric bypass surgery performed on 5/8/2019. Here for weight management. Expressed interest in appetite suppressant. Diet consists of chicken, ground turkey, cheese, potatoes, salad. Taking in 50-60 oz sugar free fluids, 40 g protein. 35 min of activity 4 days a week. Bowel movements are regular. She is compliant with multivitamins, calcium, Vit D, B1, and B12 supplements. Denies tobacco, NSAID, and ETOH use. No longer breastfeeding. Experiencing increased hunger. Used phentermine prior to bariatric surgery. Denies SE. Hx of leukopenia. Denies symptoms, no recent illness. 10/17/2023    11:02 AM 5/11/2023     9:31 AM 5/23/2022    10:38 AM 2/7/2022     8:32 PM 5/24/2021    10:46 AM   Weight Loss Metrics   Pre-op weight (manual entry) 295 lbs 295 lbs      Height 5' 7\" 5' 7\" 5' 7\" 5' 6\" 5' 7\"   Weight - Scale 179 lbs 6 oz 168 lbs 175 lbs 193 lbs 159 lbs   BMI (Calculated) 28.2 kg/m2 26.4 kg/m2 27.5 kg/m2 31.2 kg/m2 25 kg/m2   Ideal body weight (manual entry) 140 lbs 140 lbs      EBW in lbs. 155 155      Weight loss to date in lbs.  115 127      Percent weight loss (%) 39.19 % 43.05 %      Percent EBW loss (%) 74.2 % 81.9 %        Comorbidities:  Hypertension: not applicable  Diabetes: not applicable  Obstructive Sleep Apnea: not applicable  Hyperlipidemia: not applicable  Stress Urinary Incontinence: not applicable  Gastroesophageal Reflux: not applicable  Weight related arthropathy:not applicable      Past Medical History:   Diagnosis Date    Asthma     Hyperprolactinemia ([address])     Vitamin D deficiency        Past Surgical History:   Procedure Laterality Date    GI  05/08/2019    gastric bypass       Current Outpatient Medications   Medication Sig Dispense Refill    Diethylpropion HCl CR 75 MG TB24 Take 75 mg by

## 2023-11-04 ENCOUNTER — HOSPITAL ENCOUNTER (OUTPATIENT)
Facility: HOSPITAL | Age: 38
Discharge: HOME | End: 2023-11-07

## 2023-11-04 LAB — SENTARA SPECIMEN COLLECTION: NORMAL

## 2023-11-05 LAB
ATYPICAL LYMPHOCYTES: 1 %
BASOPHILS C MAN (DIFF): 1 % (ref 0–2)
BURR CELLS: ABNORMAL
LYMPHOCYTES C MAN (DIFF): 61 % (ref 20–45)
MONOCYTES C MAN (DIFF): 20 % (ref 3–12)
NEUTROPHILS C MAN (DIFF): 17 % (ref 40–75)
NORMOCHROMIC CELLAVISION: ABNORMAL
NORMOCYTIC CELLAVISION: ABNORMAL
SMEAR REVIEW: ABNORMAL

## 2023-11-14 ENCOUNTER — OFFICE VISIT (OUTPATIENT)
Age: 38
End: 2023-11-14
Payer: COMMERCIAL

## 2023-11-14 VITALS
HEART RATE: 76 BPM | HEIGHT: 67 IN | WEIGHT: 172.8 LBS | RESPIRATION RATE: 14 BRPM | SYSTOLIC BLOOD PRESSURE: 118 MMHG | DIASTOLIC BLOOD PRESSURE: 80 MMHG | TEMPERATURE: 98 F | BODY MASS INDEX: 27.12 KG/M2 | OXYGEN SATURATION: 100 %

## 2023-11-14 DIAGNOSIS — D72.819 LEUKOPENIA, UNSPECIFIED TYPE: Primary | ICD-10-CM

## 2023-11-14 DIAGNOSIS — Z71.3 ENCOUNTER FOR WEIGHT LOSS COUNSELING: ICD-10-CM

## 2023-11-14 DIAGNOSIS — Z79.899 FOLLOW-UP ENCOUNTER INVOLVING MEDICATION: ICD-10-CM

## 2023-11-14 DIAGNOSIS — Z98.84 HX OF GASTRIC BYPASS: ICD-10-CM

## 2023-11-14 DIAGNOSIS — Z86.39 HX OF OBESITY: ICD-10-CM

## 2023-11-14 PROCEDURE — 99214 OFFICE O/P EST MOD 30 MIN: CPT | Performed by: REGISTERED NURSE

## 2023-11-14 RX ORDER — DIETHYLPROPION HYDROCHLORIDE 75 MG/1
75 TABLET ORAL DAILY
Qty: 30 TABLET | Refills: 0 | Status: SHIPPED | OUTPATIENT
Start: 2023-11-14 | End: 2023-12-14

## 2023-11-14 ASSESSMENT — ENCOUNTER SYMPTOMS
GASTROINTESTINAL NEGATIVE: 1
SHORTNESS OF BREATH: 0
CHEST TIGHTNESS: 0

## 2023-11-14 NOTE — PROGRESS NOTES
Edgardo Álvarez is a 45 y.o. female (: 1985)     Chief Complaint   Patient presents with    Follow-up       Medication list and allergies have been reviewed with Edgardo Álvarez and updated as of today's date. I have gone over all Medical, Surgical and Social History with Edgardo Álvarez and updated/added the information accordingly. 1. Have you been to the ER, urgent care clinic since your last visit? Hospitalized since your last visit? No    2. Have you seen or consulted any other health care providers outside of the [address] [place] [address] since your last visit? Include any pap smears or colon screening.  No

## 2023-11-14 NOTE — PROGRESS NOTES
Weight Loss Progress Note: Initial Physician Visit      Kevin Brewer is a 45 y.o. female with BMI *** who is here for her initial evaluation for bariatric medical weight loss. CC: ***    Weight History  Current weight ***lb   Goal weight ***  Highest weight ***   (See weight gain time line scanned into media section of chart)    Food intolerances (gas, bloating, diarrhea, vomiting)? Weight loss History  How many weight loss attempts have you had?  ***  Which program were you most successful doing? ***    Significant Medical History    Have you ever taken appetite suppressants? If yes: Rx or OTC?  ***   If yes; Any negative side effects? Ever diagnosed with sleep apnea or put on CPAP? Ever had bariatric surgery?:     Pregnant or planning on becoming pregnant w/in 6 months: No.      Significant Psychosocial History   Any history of drug abuse or dependence:   Current Major Lifestyle Changes:  Any potential unsupportive people: Why are you starting a weight loss program now?  ***  Are you ready? Yes. History of binge eating disorder or anorexia:   If yes, are you currently being treated? Social History  Social History     Tobacco Use    Smoking status: Never    Smokeless tobacco: Never   Substance Use Topics    Alcohol use: Yes     Alcohol/week: 1.0 standard drink of alcohol     Types: 1 Drinks containing 0.5 oz of alcohol per week     Comment: Socially     How many times a week do you eat out? ***    Do you drink any EtOH? If so, how much?  ***      Exercise  How many days a week do you currently exercise? *** days  Have you ever been told by a physician not to exercise? Objective  There were no vitals filed for this visit.     Arm Circumference: ***  Waist Circumference: ***  Thigh Circumference: ***  Percent Body Fat: ***        Labs: ***    Review of Systems  Review of Systems        Physical Exam    Vital Signs Reviewed  Weight Management Metrics Reviewed    Vital Signs

## 2023-11-14 NOTE — PROGRESS NOTES
Bariatric Postoperative Progress Note    Chief Complaint   Patient presents with    Follow-up     Medication follow up     Dot Almonte is a 45 y.o. female now 4 years status post laparoscopic gastric bypass surgery performed on 5/8/2019. Here for weight management. Month #1 on diethylpropion. -7 lbs. Experienced difficulty sleeping for two weeks, resolved. Had two menstrual cycles. Diet consists of chicken, ground turkey, cheese, potatoes, salad. Taking in 70 oz sugar free fluids, 50 g protein. 30 min of activity 5 days a week. Bowel movements are regular. She is compliant with multivitamins, calcium, Vit D, B1, and B12 supplements. Denies tobacco, NSAID, and ETOH use. Hx of leukopenia. Denies symptoms, no recent illness. Requesting hematology referral for monitoring. 11/14/2023    10:17 AM 10/17/2023    11:02 AM 5/11/2023     9:31 AM 5/23/2022    10:38 AM 2/7/2022     8:32 PM 5/24/2021    10:46 AM   Weight Loss Metrics   Pre-op weight (manual entry) 295 lbs 295 lbs 295 lbs      Height 5' 7\" 5' 7\" 5' 7\" 5' 7\" 5' 6\" 5' 7\"   Weight - Scale 172 lbs 13 oz 179 lbs 6 oz 168 lbs 175 lbs 193 lbs 159 lbs   BMI (Calculated) 27.1 kg/m2 28.2 kg/m2 26.4 kg/m2 27.5 kg/m2 31.2 kg/m2 25 kg/m2   Ideal body weight (manual entry) 140 lbs 140 lbs 140 lbs      EBW in lbs. 155 155 155      Weight loss to date in lbs.  122 115 127      Percent weight loss (%) 41.42 % 39.19 % 43.05 %      Percent EBW loss (%) 78.7 % 74.2 % 81.9 %        Comorbidities:  Hypertension: not applicable  Diabetes: not applicable  Obstructive Sleep Apnea: not applicable  Hyperlipidemia: not applicable  Stress Urinary Incontinence: not applicable  Gastroesophageal Reflux: not applicable  Weight related arthropathy:not applicable      Past Medical History:   Diagnosis Date    Asthma     Hyperprolactinemia ([address])     Vitamin D deficiency        Past Surgical History:   Procedure Laterality Date    GI  05/08/2019    gastric bypass       Current

## 2024-01-22 ENCOUNTER — OFFICE VISIT (OUTPATIENT)
Age: 39
End: 2024-01-22
Payer: COMMERCIAL

## 2024-01-22 VITALS
SYSTOLIC BLOOD PRESSURE: 126 MMHG | BODY MASS INDEX: 28.41 KG/M2 | DIASTOLIC BLOOD PRESSURE: 72 MMHG | WEIGHT: 181 LBS | OXYGEN SATURATION: 100 % | TEMPERATURE: 98 F | HEIGHT: 67 IN | HEART RATE: 58 BPM

## 2024-01-22 DIAGNOSIS — Z79.899 FOLLOW-UP ENCOUNTER INVOLVING MEDICATION: Primary | ICD-10-CM

## 2024-01-22 DIAGNOSIS — Z86.39 HX OF OBESITY: ICD-10-CM

## 2024-01-22 DIAGNOSIS — Z71.3 ENCOUNTER FOR WEIGHT LOSS COUNSELING: ICD-10-CM

## 2024-01-22 DIAGNOSIS — Z98.84 HX OF GASTRIC BYPASS: ICD-10-CM

## 2024-01-22 DIAGNOSIS — E66.3 OVERWEIGHT (BMI 25.0-29.9): ICD-10-CM

## 2024-01-22 PROCEDURE — 99214 OFFICE O/P EST MOD 30 MIN: CPT | Performed by: REGISTERED NURSE

## 2024-01-22 RX ORDER — DIETHYLPROPION HYDROCHLORIDE 75 MG/1
75 TABLET ORAL DAILY
Qty: 30 TABLET | Refills: 1 | Status: SHIPPED | OUTPATIENT
Start: 2024-01-22 | End: 2024-02-21

## 2024-01-22 ASSESSMENT — ENCOUNTER SYMPTOMS
GASTROINTESTINAL NEGATIVE: 1
SHORTNESS OF BREATH: 0
CHEST TIGHTNESS: 0

## 2024-01-22 NOTE — PROGRESS NOTES
Bariatric Postoperative Progress Note    Chief Complaint   Patient presents with    Weight Management     Medication follow up, LGBP 5/8/19     Vanesa Carreon is a 38 y.o. female now 4 years status post laparoscopic gastric bypass surgery performed on 5/8/2019.    Here for weight management. Month #2 on diethylpropion.  Effective for appetite suppression.  Was unable to follow-up last month.  Needs medication refill.  +9 lbs. Holidays and vacation but back on track. Diet consists of chicken, ground turkey, potatoes, beef, rice, vegetables. Taking in 64 oz sugar free fluids, 60 g protein. 30-60 min of activity 6 days a week. Bowel movements are regular. She is compliant with multivitamins, calcium, Vit D, B1, and B12 supplements.  Denies tobacco, NSAID, and ETOH use.         1/22/2024     9:29 AM 11/14/2023    10:17 AM 10/17/2023    11:02 AM 5/11/2023     9:31 AM 5/23/2022    10:38 AM 2/7/2022     8:32 PM 5/24/2021    10:46 AM   Weight Loss Metrics   Pre-op weight (manual entry) 295 lbs 295 lbs 295 lbs 295 lbs      Height 5' 7\" 5' 7\" 5' 7\" 5' 7\" 5' 7\" 5' 6\" 5' 7\"   Weight - Scale 181 lbs 172 lbs 13 oz 179 lbs 6 oz 168 lbs 175 lbs 193 lbs 159 lbs   BMI (Calculated) 28.4 kg/m2 27.1 kg/m2 28.2 kg/m2 26.4 kg/m2 27.5 kg/m2 31.2 kg/m2 25 kg/m2   Ideal body weight (manual entry) 140 lbs 140 lbs 140 lbs 140 lbs      EBW in lbs. 155 155 155 155      Weight loss to date in lbs. 114 122 115 127      Percent weight loss (%) 38.64 % 41.42 % 39.19 % 43.05 %      Percent EBW loss (%) 73.5 % 78.7 % 74.2 % 81.9 %        Comorbidities:  Hypertension: not applicable  Diabetes: not applicable  Obstructive Sleep Apnea: not applicable  Hyperlipidemia: not applicable  Stress Urinary Incontinence: not applicable  Gastroesophageal Reflux: not applicable  Weight related arthropathy:not applicable      Past Medical History:   Diagnosis Date    Asthma     Hyperprolactinemia (HCC)     Vitamin D deficiency        Past Surgical History:

## 2024-03-28 ENCOUNTER — OFFICE VISIT (OUTPATIENT)
Age: 39
End: 2024-03-28
Payer: COMMERCIAL

## 2024-03-28 VITALS
OXYGEN SATURATION: 100 % | HEIGHT: 67 IN | TEMPERATURE: 97.3 F | BODY MASS INDEX: 28.09 KG/M2 | RESPIRATION RATE: 14 BRPM | WEIGHT: 179 LBS | SYSTOLIC BLOOD PRESSURE: 122 MMHG | HEART RATE: 63 BPM | DIASTOLIC BLOOD PRESSURE: 70 MMHG

## 2024-03-28 DIAGNOSIS — Z86.39 HX OF OBESITY: ICD-10-CM

## 2024-03-28 DIAGNOSIS — E66.3 OVERWEIGHT (BMI 25.0-29.9): ICD-10-CM

## 2024-03-28 DIAGNOSIS — Z79.899 FOLLOW-UP ENCOUNTER INVOLVING MEDICATION: Primary | ICD-10-CM

## 2024-03-28 DIAGNOSIS — Z71.3 ENCOUNTER FOR WEIGHT LOSS COUNSELING: ICD-10-CM

## 2024-03-28 DIAGNOSIS — Z79.899 FOLLOW-UP ENCOUNTER INVOLVING MEDICATION: ICD-10-CM

## 2024-03-28 PROCEDURE — 99214 OFFICE O/P EST MOD 30 MIN: CPT | Performed by: REGISTERED NURSE

## 2024-03-28 RX ORDER — DIETHYLPROPION HYDROCHLORIDE 75 MG/1
75 TABLET ORAL DAILY
Qty: 30 TABLET | Refills: 1 | Status: SHIPPED | OUTPATIENT
Start: 2024-03-28 | End: 2024-04-27

## 2024-03-28 NOTE — PROGRESS NOTES
Haile [place] Alive    [facility] (Not Specified)     Review of Systems  Constitutional:  Negative for fatigue and unexpected weight change.   Eyes:  Negative for visual disturbance.   Respiratory:  Negative for chest tightness and shortness of breath.    Cardiovascular:  Negative for chest pain and palpitations.   Gastrointestinal: Negative.    Genitourinary: Negative.    Neurological:  Negative for dizziness, light-headedness and headaches.   Psychiatric/Behavioral:  Negative for self-injury, sleep disturbance and suicidal ideas.    Objective  Vitals:    03/28/24 0845   BP: 122/70   Pulse: 63   Resp: 14   Temp: 97.3 °F (36.3 °C)   SpO2: 100%   Weight: 81.2 kg (179 lb)   Height: 1.702 m (5' 7\")      No LMP recorded. Patient has had an implant.    Physical Exam  Vitals and nursing note reviewed.   Constitutional:       General: Not in acute distress.  HENT:      Head: Normocephalic and atraumatic.   Eyes:      Pupils: Pupils are equal, round, and reactive to light.   Cardiovascular:      Rate and Rhythm: Normal rate and regular rhythm.   Pulmonary:      Effort: Pulmonary effort is normal.      Breath sounds: Normal breath sounds.   Musculoskeletal:         General: Normal range of motion.   Neurological:      Mental Status: Is alert and oriented to person, place, and time.   Psychiatric:         Mood and Affect: Mood normal.         Behavior: Behavior normal.         Thought Content: Thought content normal.     No results found for this or any previous visit (from the past 672 hour(s)).    Assessment / Plan  Encounter Diagnoses   Name Primary?    Hx of obesity     Encounter for weight loss counseling     Follow-up encounter involving medication Yes    Overweight (BMI 25.0-29.9)      1.  Weight management      Today, the patient is  Height: 170.2 cm (5' 7\") tall, Weight - Scale: 81.2 kg (179 lb) lbs for a Body mass index is 28.04 kg/m².      Evaluation of progress: Medication therapy effective. Continue

## 2024-05-02 ENCOUNTER — HOSPITAL ENCOUNTER (OUTPATIENT)
Facility: HOSPITAL | Age: 39
Discharge: HOME | End: 2024-05-05

## 2024-05-02 LAB — SENTARA SPECIMEN COLLECTION: NORMAL

## 2024-05-02 PROCEDURE — 99001 SPECIMEN HANDLING PT-LAB: CPT

## 2024-05-06 LAB — THIAMINE BLOOD: 76 NMOL/L (ref 78–185)

## 2024-05-08 LAB
A/G RATIO: 1.4 RATIO (ref 1.1–2.6)
ALBUMIN: 4 G/DL (ref 3.5–5)
ALP BLD-CCNC: 61 U/L (ref 25–115)
ALT SERPL-CCNC: 13 U/L (ref 5–40)
ANION GAP SERPL CALCULATED.3IONS-SCNC: 9 MMOL/L (ref 3–15)
AST SERPL-CCNC: 17 U/L (ref 10–37)
BASOPHILS ABSOLUTE: 0 K/UL (ref 0–0.2)
BASOPHILS RELATIVE PERCENT: 1 % (ref 0–2)
BILIRUB SERPL-MCNC: 0.3 MG/DL (ref 0.2–1.2)
BUN BLDV-MCNC: 13 MG/DL (ref 6–22)
CALCIUM SERPL-MCNC: 9.4 MG/DL (ref 8.4–10.5)
CHLORIDE BLD-SCNC: 101 MMOL/L (ref 98–110)
CO2: 26 MMOL/L (ref 20–32)
CREAT SERPL-MCNC: 0.8 MG/DL (ref 0.5–1.2)
EOSINOPHIL # BLD: 2 % (ref 0–6)
EOSINOPHILS ABSOLUTE: 0.1 K/UL (ref 0–0.5)
FERRITIN: 16 NG/ML (ref 10–291)
GFR, ESTIMATED: >60 ML/MIN/1.73 SQ.M.
GLOBULIN: 2.9 G/DL (ref 2–4)
GLUCOSE: 68 MG/DL (ref 70–99)
HCT VFR BLD CALC: 39 % (ref 35.1–46.5)
HEMOGLOBIN: 12.2 G/DL (ref 11.7–15.5)
IRON: 120 MCG/DL (ref 30–160)
LYMPHOCYTES # BLD: 50 % (ref 20–45)
LYMPHOCYTES ABSOLUTE: 1.6 K/UL (ref 1–4.8)
MCH RBC QN AUTO: 31 PG (ref 26–34)
MCHC RBC AUTO-ENTMCNC: 31 G/DL (ref 31–36)
MCV RBC AUTO: 99 FL (ref 80–99)
MONOCYTES ABSOLUTE: 0.5 K/UL (ref 0.1–1)
MONOCYTES: 14 % (ref 3–12)
NEUTROPHILS ABSOLUTE: 1.1 K/UL (ref 1.8–7.7)
NEUTROPHILS: 33 % (ref 40–75)
PDW BLD-RTO: 13.8 % (ref 10–15.5)
PLATELET # BLD: 256 K/UL (ref 140–440)
PMV BLD AUTO: 10.4 FL (ref 9–13)
POTASSIUM SERPL-SCNC: 4.5 MMOL/L (ref 3.5–5.5)
RBC # BLD: 3.93 M/UL (ref 3.8–5.2)
SODIUM BLD-SCNC: 136 MMOL/L (ref 133–145)
TOTAL PROTEIN: 6.9 G/DL (ref 6.4–8.3)
VITAMIN B-12: 462 PG/ML (ref 211–911)
VITAMIN D 25-HYDROXY: 24.8 NG/ML (ref 32–100)
WBC # BLD: 3.3 K/UL (ref 4–11)

## 2024-05-09 ENCOUNTER — OFFICE VISIT (OUTPATIENT)
Age: 39
End: 2024-05-09
Payer: COMMERCIAL

## 2024-05-09 ENCOUNTER — CLINICAL DOCUMENTATION (OUTPATIENT)
Age: 39
End: 2024-05-09

## 2024-05-09 VITALS
RESPIRATION RATE: 18 BRPM | HEIGHT: 67 IN | WEIGHT: 176 LBS | BODY MASS INDEX: 27.62 KG/M2 | DIASTOLIC BLOOD PRESSURE: 64 MMHG | TEMPERATURE: 97.7 F | HEART RATE: 67 BPM | SYSTOLIC BLOOD PRESSURE: 114 MMHG | OXYGEN SATURATION: 99 %

## 2024-05-09 DIAGNOSIS — Z98.84 HX OF GASTRIC BYPASS: ICD-10-CM

## 2024-05-09 DIAGNOSIS — E66.3 OVERWEIGHT (BMI 25.0-29.9): ICD-10-CM

## 2024-05-09 DIAGNOSIS — Z79.899 FOLLOW-UP ENCOUNTER INVOLVING MEDICATION: ICD-10-CM

## 2024-05-09 DIAGNOSIS — E51.9 THIAMINE DEFICIENCY: ICD-10-CM

## 2024-05-09 DIAGNOSIS — Z71.3 ENCOUNTER FOR WEIGHT LOSS COUNSELING: ICD-10-CM

## 2024-05-09 DIAGNOSIS — Z86.39 HX OF OBESITY: ICD-10-CM

## 2024-05-09 DIAGNOSIS — Z79.899 FOLLOW-UP ENCOUNTER INVOLVING MEDICATION: Primary | ICD-10-CM

## 2024-05-09 PROCEDURE — 99214 OFFICE O/P EST MOD 30 MIN: CPT | Performed by: REGISTERED NURSE

## 2024-05-09 PROCEDURE — 96372 THER/PROPH/DIAG INJ SC/IM: CPT | Performed by: REGISTERED NURSE

## 2024-05-09 RX ORDER — THIAMINE HYDROCHLORIDE 100 MG/ML
200 INJECTION, SOLUTION INTRAMUSCULAR; INTRAVENOUS ONCE
Status: COMPLETED | OUTPATIENT
Start: 2024-05-09 | End: 2024-05-09

## 2024-05-09 RX ORDER — CYANOCOBALAMIN (VITAMIN B-12) 100 MCG
1 TABLET ORAL DAILY
Qty: 90 TABLET | Refills: 3 | Status: SHIPPED | OUTPATIENT
Start: 2024-05-09

## 2024-05-09 RX ORDER — DIETHYLPROPION HYDROCHLORIDE 75 MG/1
TABLET ORAL
COMMUNITY
Start: 2024-05-07 | End: 2024-05-09 | Stop reason: DRUGHIGH

## 2024-05-09 RX ORDER — DIETHYLPROPION HYDROCHLORIDE 25 MG/1
25 TABLET ORAL 3 TIMES DAILY PRN
Qty: 90 TABLET | Refills: 1 | Status: SHIPPED | OUTPATIENT
Start: 2024-05-09 | End: 2024-06-08

## 2024-05-09 RX ORDER — CYANOCOBALAMIN 1000 UG/ML
1000 INJECTION, SOLUTION INTRAMUSCULAR; SUBCUTANEOUS ONCE
Status: COMPLETED | OUTPATIENT
Start: 2024-05-09 | End: 2024-05-09

## 2024-05-09 RX ADMIN — CYANOCOBALAMIN 1000 MCG: 1000 INJECTION, SOLUTION INTRAMUSCULAR; SUBCUTANEOUS at 10:47

## 2024-05-09 RX ADMIN — THIAMINE HYDROCHLORIDE 200 MG: 100 INJECTION, SOLUTION INTRAMUSCULAR; INTRAVENOUS at 10:49

## 2024-05-09 NOTE — PROGRESS NOTES
[facility] Nurse Note:       CC: Weight Management    Vanesa Carreon is a 38 y.o. female who is here for her f/up medical provider visit for the Medication program.       Did you have any problems adhering to the program since last visit? No  If yes, please explain:     Since your last visit, have you experienced any complications? No    Have you received any other medical care since last visit? No  If yes, where and for what?     Have you had any change in your medications since your last visit? No If yes what?     Are you taking an anti-obesity medication? Yes If yes what and are you experiencing any side effects? Diethylpropion 75mg with no side effects.      Would you still like to continue your current medication and dosage? Yes    BP Readings from Last 3 Encounters:   05/09/24 114/64   03/28/24 122/70   01/22/24 126/72        Eating Habits Over Last Week:    How many oz of sugar-free fluids are you consuming? 48-64oz daily.    Did you consume your prescribed meal replacement regimen each day? Yes    Physical Activity Routine:    Aerobic exercise: Cardio 4 days weekly.    Resistance exercise: Strength training 4 days weekly.    
  [facility] Nurse Note:       CC: Weight Management    Vanesa Carreon is a 38 y.o. female who is here for her f/up medical provider visit for the Medication program.       Did you have any problems adhering to the program since last visit? No  If yes, please explain:     Since your last visit, have you experienced any complications? No    Have you received any other medical care since last visit? No  If yes, where and for what?     Have you had any change in your medications since your last visit? No If yes what?     Are you taking an anti-obesity medication? Yes If yes what and are you experiencing any side effects? Diethylpropion 75mg with no side effects.     Would you still like to continue your current medication and dosage? Yes    BP Readings from Last 3 Encounters:   05/09/24 114/64   03/28/24 122/70   01/22/24 126/72        Eating Habits Over Last Week:    How many oz of sugar-free fluids are you consuming? 50-64oz daily.    Did you consume your prescribed meal replacement regimen each day? Yes    Physical Activity Routine:    Aerobic exercise: Cardio 4 days weekly.    Resistance exercise: Strength training 4 days weekly.    
 Bariatric Postoperative Nurse Note      Vanesa Carreon is a 38 y.o. female status post laparoscopic gastric bypass surgery performed on 05/08/2019.    All Post-Ops (including two weeks)  -# of grams of protein daily? 60g  -sources of protein? Chicken  -# of oz of sugar free fluids from all sources daily?  48-64oz daily.  -Nausea? No  -Vomiting? No  -Difficulty swallow/food sticking? No  -Heartburn/regurgitation? No  -Character of bowel movements (diarrhea/constipation/bloody stools?) regular  -Which multivitamin product are you taking? Bariatric Pal   -What dose and how frequently are you taking calcium citrate? 500mg 3x daily.  - from any iron-containing multivitamin by 2 hours? Yes  -Ulcer risk exposures:   NSAID No  Tobacco No  Alcohol No  Steroids No  -Minutes of physical activity and what type? Cardio and strength training 4 days weekly.        
(03520 UT) TABS Take 1 tablet by mouth every 7 days for 12 doses 12 tablet 0    Cyanocobalamin (B-12-SL) 1000 MCG SUBL Place 1 tablet under the tongue daily 90 tablet 3    etonogestrel (NEXPLANON) 68 MG implant 68 mg once      Multiple Vitamins-Minerals (BARIATRIC MULTIVITAMINS/IRON) CAPS Take by mouth daily Bariatric pal multivitamin for women      ascorbic acid (VITAMIN C) 500 MG tablet Take by mouth      Calcium Citrate-Vitamin D3 315-6.25 MG-MCG TABS Take 1 tablet by mouth 3 times daily      ferrous sulfate (IRON 325) 325 (65 Fe) MG tablet Take by mouth every morning (before breakfast)       No current facility-administered medications for this visit.     No Known Allergies    Social History     Tobacco Use    Smoking status: Never    Smokeless tobacco: Never   Vaping Use    Vaping Use: Never used   Substance Use Topics    Alcohol use: Yes     Alcohol/week: 1.0 standard drink of alcohol     Types: 1 Drinks containing 0.5 oz of alcohol per week     Comment: Socially    Drug use: No     Family History   Problem Relation Age of Onset    Hypertension Mother     Sleep Apnea Mother     Hypertension Father     Diabetes Father     High Blood Pressure Maternal Grandmother      Family Status   Relation Name Status    Mother  Alive    Father Haile [place] Alive    MGM Lorri Robert (Not Specified)     Review of Systems  Constitutional:  Negative for fatigue and unexpected weight change.   Eyes:  Negative for visual disturbance.   Respiratory:  Negative for chest tightness and shortness of breath.    Cardiovascular:  Negative for chest pain and palpitations.   Gastrointestinal: Negative.    Genitourinary: Negative.    Neurological:  Negative for dizziness, light-headedness and headaches.   Psychiatric/Behavioral:  Negative for self-injury, sleep disturbance and suicidal ideas.    Objective  Vitals:    05/09/24 0902   BP: 114/64   Site: Left Upper Arm   Position: Sitting   Pulse: 67   Resp: 18   Temp: 97.7 °F (36.5 °C)

## 2024-05-09 NOTE — PROGRESS NOTES
Patient was seen in office for left deltoid injection of vitamin B12 1,000mcg and bilateral deltoid injection of vitamin B1 100mg in each arm. Patient tolerated injections well.

## 2024-06-22 ENCOUNTER — HOSPITAL ENCOUNTER (OUTPATIENT)
Facility: HOSPITAL | Age: 39
Discharge: HOME | End: 2024-06-25

## 2024-06-22 LAB — SENTARA SPECIMEN COLLECTION: NORMAL

## 2024-06-22 PROCEDURE — 99001 SPECIMEN HANDLING PT-LAB: CPT

## 2024-06-23 LAB
A/G RATIO: 1.6 RATIO (ref 1.1–2.6)
ALBUMIN: 4.1 G/DL (ref 3.5–5)
ALP BLD-CCNC: 66 U/L (ref 25–115)
ALT SERPL-CCNC: 12 U/L (ref 5–40)
ANION GAP SERPL CALCULATED.3IONS-SCNC: 7 MMOL/L (ref 3–15)
AST SERPL-CCNC: 14 U/L (ref 10–37)
BASOPHILS ABSOLUTE: 0 K/UL (ref 0–0.2)
BASOPHILS C MAN (DIFF): 2 % (ref 0–2)
BILIRUB SERPL-MCNC: 0.3 MG/DL (ref 0.2–1.2)
BUN BLDV-MCNC: 16 MG/DL (ref 6–22)
CALCIUM SERPL-MCNC: 9.4 MG/DL (ref 8.4–10.5)
CHLORIDE BLD-SCNC: 105 MMOL/L (ref 98–110)
CO2: 28 MMOL/L (ref 20–32)
CREAT SERPL-MCNC: 0.8 MG/DL (ref 0.5–1.2)
EOSINOPHILS ABSOLUTE: 0 K/UL (ref 0–0.5)
EOSINOPHILS C MAN (DIFF): 2 % (ref 0–6)
FERRITIN: 13 NG/ML (ref 10–291)
GFR, ESTIMATED: >60 ML/MIN/1.73 SQ.M.
GLOBULIN: 2.6 G/DL (ref 2–4)
GLUCOSE: 77 MG/DL (ref 70–99)
HCT VFR BLD CALC: 36.8 % (ref 35.1–46.5)
HEMOGLOBIN: 11.4 G/DL (ref 11.7–15.5)
IRON: 99 MCG/DL (ref 30–160)
LYMPHOCYTES ABSOLUTE: 1.5 K/UL (ref 1–4.8)
LYMPHOCYTES C MAN (DIFF): 69 % (ref 20–45)
MACROCYTOSIS: ABNORMAL
MCH RBC QN AUTO: 31 PG (ref 26–34)
MCHC RBC AUTO-ENTMCNC: 31 G/DL (ref 31–36)
MCV RBC AUTO: 100 FL (ref 80–99)
MONOCYTES ABSOLUTE: 0.2 K/UL (ref 0.1–1)
MONOCYTES C MAN (DIFF): 8 % (ref 3–12)
NEUTROPHILS ABSOLUTE: 0.4 K/UL (ref 1.8–7.7)
NEUTROPHILS C MAN (DIFF): 19 % (ref 40–75)
NORMOCHROMIC RBC: ABNORMAL
PDW BLD-RTO: 15 % (ref 10–15.5)
PLATELET # BLD: 230 K/UL (ref 140–440)
PMV BLD AUTO: 10.8 FL (ref 9–13)
POTASSIUM SERPL-SCNC: 4.7 MMOL/L (ref 3.5–5.5)
RBC # BLD: 3.67 M/UL (ref 3.8–5.2)
SMEAR REVIEW: ABNORMAL
SODIUM BLD-SCNC: 140 MMOL/L (ref 133–145)
TOTAL PROTEIN: 6.7 G/DL (ref 6.4–8.3)
VITAMIN B-12: 579 PG/ML (ref 211–911)
VITAMIN D 25-HYDROXY: 27.4 NG/ML (ref 32–100)
WBC # BLD: 2.2 K/UL (ref 4–11)

## 2024-06-25 ENCOUNTER — OFFICE VISIT (OUTPATIENT)
Age: 39
End: 2024-06-25
Payer: COMMERCIAL

## 2024-06-25 VITALS
BODY MASS INDEX: 27.31 KG/M2 | SYSTOLIC BLOOD PRESSURE: 106 MMHG | TEMPERATURE: 98 F | DIASTOLIC BLOOD PRESSURE: 69 MMHG | WEIGHT: 174 LBS | RESPIRATION RATE: 18 BRPM | OXYGEN SATURATION: 99 % | HEART RATE: 65 BPM | HEIGHT: 67 IN

## 2024-06-25 DIAGNOSIS — K91.2 POSTSURGICAL MALABSORPTION: ICD-10-CM

## 2024-06-25 DIAGNOSIS — Z79.899 FOLLOW-UP ENCOUNTER INVOLVING MEDICATION: Primary | ICD-10-CM

## 2024-06-25 DIAGNOSIS — Z71.3 ENCOUNTER FOR WEIGHT LOSS COUNSELING: ICD-10-CM

## 2024-06-25 DIAGNOSIS — E55.9 VITAMIN D DEFICIENCY: ICD-10-CM

## 2024-06-25 DIAGNOSIS — Z86.39 HX OF OBESITY: ICD-10-CM

## 2024-06-25 DIAGNOSIS — Z98.84 HX OF GASTRIC BYPASS: ICD-10-CM

## 2024-06-25 DIAGNOSIS — E51.9 THIAMINE DEFICIENCY: ICD-10-CM

## 2024-06-25 DIAGNOSIS — E66.3 OVERWEIGHT (BMI 25.0-29.9): ICD-10-CM

## 2024-06-25 DIAGNOSIS — Z79.899 FOLLOW-UP ENCOUNTER INVOLVING MEDICATION: ICD-10-CM

## 2024-06-25 PROCEDURE — 99214 OFFICE O/P EST MOD 30 MIN: CPT | Performed by: REGISTERED NURSE

## 2024-06-25 PROCEDURE — 96372 THER/PROPH/DIAG INJ SC/IM: CPT | Performed by: REGISTERED NURSE

## 2024-06-25 RX ORDER — CYANOCOBALAMIN 1000 UG/ML
1000 INJECTION, SOLUTION INTRAMUSCULAR; SUBCUTANEOUS ONCE
Status: COMPLETED | OUTPATIENT
Start: 2024-06-25 | End: 2024-06-25

## 2024-06-25 RX ORDER — DIETHYLPROPION HYDROCHLORIDE 25 MG/1
25 TABLET ORAL 3 TIMES DAILY PRN
Qty: 90 TABLET | Refills: 1 | Status: SHIPPED | OUTPATIENT
Start: 2024-06-25 | End: 2024-07-25

## 2024-06-25 RX ORDER — THIAMINE HYDROCHLORIDE 100 MG/ML
200 INJECTION, SOLUTION INTRAMUSCULAR; INTRAVENOUS ONCE
Status: COMPLETED | OUTPATIENT
Start: 2024-06-25 | End: 2024-06-25

## 2024-06-25 RX ADMIN — THIAMINE HYDROCHLORIDE 200 MG: 100 INJECTION, SOLUTION INTRAMUSCULAR; INTRAVENOUS at 11:52

## 2024-06-25 RX ADMIN — CYANOCOBALAMIN 1000 MCG: 1000 INJECTION, SOLUTION INTRAMUSCULAR; SUBCUTANEOUS at 11:52

## 2024-06-25 NOTE — PROGRESS NOTES
Bariatric Postoperative Nurse Note    Vanesa Carreon is a 38 y.o. female status post laparoscopic gastric bypass surgery performed on 05/08/2019.    All Post-Ops (including two weeks)  -# of grams of protein daily? 60g  -sources of protein? Chicken.  -# of oz of sugar free fluids from all sources daily? 48-64oz daily.  -Nausea? No  -Vomiting? No  -Difficulty swallow/food sticking? No  -Heartburn/regurgitation? No  -Character of bowel movements (diarrhea/constipation/bloody stools?) regular  -Which multivitamin product are you taking? Bariatric Pal   -What dose and how frequently are you taking calcium citrate? 500mg 3x daily.  - from any iron-containing multivitamin by 2 hours? Yes  -Ulcer risk exposures:   NSAID No  Tobacco No  Alcohol No  Steroids No  -Minutes of physical activity and what type? Cardio and Strength training 5 days weekly x 30 mins to 1 hour.        
disturbance and suicidal ideas.    Objective  Vitals:    06/25/24 0925   BP: 106/69   Site: Left Upper Arm   Position: Sitting   Pulse: 65   Resp: 18   Temp: 98 °F (36.7 °C)   TempSrc: Temporal   SpO2: 99%   Weight: 78.9 kg (174 lb)   Height: 1.702 m (5' 7\")      No LMP recorded. Patient has had an implant.    Physical Exam  Vitals and nursing note reviewed.   Constitutional:       General: Not in acute distress.  HENT:      Head: Normocephalic and atraumatic.   Eyes:      Pupils: Pupils are equal, round, and reactive to light.   Cardiovascular:      Rate and Rhythm: Normal rate and regular rhythm.   Pulmonary:      Effort: Pulmonary effort is normal.      Breath sounds: Normal breath sounds.   Musculoskeletal:         General: Normal range of motion.   Neurological:      Mental Status: Is alert and oriented to person, place, and time.   Psychiatric:         Mood and Affect: Mood normal.         Behavior: Behavior normal.         Thought Content: Thought content normal.     Recent Results (from the past 672 hour(s))   [facility] specimen collection    Collection Time: 06/22/24  8:44 AM   Result Value Ref Range    [facility] Specimen Collection Specimens collected/sent to [facility]     Comprehensive Metabolic Panel    Collection Time: 06/22/24  8:44 AM   Result Value Ref Range    Potassium 4.7 3.5 - 5.5 mmol/L    Sodium 140 133 - 145 mmol/L    Chloride 105 98 - 110 mmol/L    Glucose 77 70 - 99 mg/dL    Calcium 9.4 8.4 - 10.5 mg/dL    Albumin 4.1 3.5 - 5.0 g/dL    ALT 12 5 - 40 U/L    AST 14 10 - 37 U/L    Total Bilirubin 0.3 0.2 - 1.2 mg/dL    Alkaline Phosphatase 66 25 - 115 U/L    BUN 16 6 - 22 mg/dL    CO2 28 20 - 32 mmol/L    Creatinine 0.8 0.5 - 1.2 mg/dL    Est, Glom Filt Rate >60.0 >60.0 mL/min/1.73 sq.m.    Globulin 2.6 2.0 - 4.0 g/dL    Albumin/Globulin Ratio 1.6 1.1 - 2.6 ratio    Total Protein 6.7 6.4 - 8.3 g/dL    Anion Gap 7.0 3.0 - 15.0 mmol/L   Ferritin    Collection Time: 06/22/24  8:44 AM   Result Value Ref

## 2024-06-29 LAB — THIAMINE BLOOD: 71 NMOL/L (ref 78–185)

## 2024-08-11 RX ORDER — ERGOCALCIFEROL 1.25 MG/1
CAPSULE ORAL
Qty: 4 CAPSULE | Refills: 2 | Status: SHIPPED | OUTPATIENT
Start: 2024-08-11

## 2024-10-09 ENCOUNTER — TELEPHONE (OUTPATIENT)
Age: 39
End: 2024-10-09

## 2024-10-09 DIAGNOSIS — E66.3 OVERWEIGHT (BMI 25.0-29.9): ICD-10-CM

## 2024-10-09 DIAGNOSIS — Z86.39 HX OF OBESITY: ICD-10-CM

## 2024-10-09 DIAGNOSIS — Z71.3 ENCOUNTER FOR WEIGHT LOSS COUNSELING: ICD-10-CM

## 2024-10-09 DIAGNOSIS — Z79.899 FOLLOW-UP ENCOUNTER INVOLVING MEDICATION: Primary | ICD-10-CM

## 2024-10-09 NOTE — TELEPHONE ENCOUNTER
Patient is calling regarding prescription Diethylpropion, states she has an upcoming appt 10/25/24 but only got about 3 days worth of medication left. Wants to know can she get a refill and if so can the dosage be changed to 1 time a day and or does she need to wait to be seen before refill or continuance

## 2024-10-10 RX ORDER — DIETHYLPROPION HYDROCHLORIDE 75 MG/1
75 TABLET, EXTENDED RELEASE ORAL DAILY
Qty: 30 TABLET | Refills: 1 | Status: SHIPPED | OUTPATIENT
Start: 2024-10-10 | End: 2024-11-09

## 2024-10-10 NOTE — TELEPHONE ENCOUNTER
Pt would like to restart diethylpropion--reviewed potential SE, risks, cost, and benefits. Order 75 mg tab ER, #30, R1--pt understands will need monthly visits. Follow up scheduled

## 2024-10-22 ENCOUNTER — HOSPITAL ENCOUNTER (OUTPATIENT)
Facility: HOSPITAL | Age: 39
Setting detail: SPECIMEN
Discharge: HOME | End: 2024-10-25

## 2024-10-22 LAB — SENTARA SPECIMEN COLLECTION: NORMAL

## 2024-10-22 PROCEDURE — 99001 SPECIMEN HANDLING PT-LAB: CPT

## 2024-10-23 LAB
BASOPHILS ABSOLUTE: 0 K/UL (ref 0–0.2)
BASOPHILS RELATIVE PERCENT: 1 % (ref 0–2)
EOSINOPHIL # BLD: 2 % (ref 0–6)
EOSINOPHILS ABSOLUTE: 0.1 K/UL (ref 0–0.5)
HCT VFR BLD CALC: 33.8 % (ref 35.1–46.5)
HEMOGLOBIN: 10.4 G/DL (ref 11.7–15.5)
LYMPHOCYTES # BLD: 45 % (ref 20–45)
LYMPHOCYTES ABSOLUTE: 1.9 K/UL (ref 1–4.8)
MCH RBC QN AUTO: 30 PG (ref 26–34)
MCHC RBC AUTO-ENTMCNC: 31 G/DL (ref 31–36)
MCV RBC AUTO: 98 FL (ref 80–99)
MONOCYTES ABSOLUTE: 0.6 K/UL (ref 0.1–1)
MONOCYTES: 15 % (ref 3–12)
NEUTROPHILS ABSOLUTE: 1.6 K/UL (ref 1.8–7.7)
NEUTROPHILS: 38 % (ref 40–75)
PDW BLD-RTO: 15.1 % (ref 10–15.5)
PLATELET # BLD: 241 K/UL (ref 140–440)
PMV BLD AUTO: 10.4 FL (ref 9–13)
RBC # BLD: 3.45 M/UL (ref 3.8–5.2)
WBC # BLD: 4.3 K/UL (ref 4–11)

## 2024-10-25 ENCOUNTER — OFFICE VISIT (OUTPATIENT)
Age: 39
End: 2024-10-25
Payer: COMMERCIAL

## 2024-10-25 VITALS — BODY MASS INDEX: 27.47 KG/M2 | WEIGHT: 175 LBS | HEIGHT: 67 IN

## 2024-10-25 DIAGNOSIS — Z71.3 ENCOUNTER FOR WEIGHT LOSS COUNSELING: ICD-10-CM

## 2024-10-25 DIAGNOSIS — Z79.899 FOLLOW-UP ENCOUNTER INVOLVING MEDICATION: Primary | ICD-10-CM

## 2024-10-25 DIAGNOSIS — Z98.84 HX OF GASTRIC BYPASS: ICD-10-CM

## 2024-10-25 DIAGNOSIS — Z86.39 HX OF OBESITY: ICD-10-CM

## 2024-10-25 DIAGNOSIS — E66.3 OVERWEIGHT (BMI 25.0-29.9): ICD-10-CM

## 2024-10-25 PROCEDURE — 99214 OFFICE O/P EST MOD 30 MIN: CPT | Performed by: REGISTERED NURSE

## 2024-10-25 NOTE — PROGRESS NOTES
Bariatric Postoperative Nurse Note    Vanesarupal Carreon is a 39 y.o. female status post laparoscopic gastric bypass surgery performed on 5/8/2019.      All Post-Ops (including two weeks)  -# of grams of protein daily? Unknown   -sources of protein? Protein powder, seafood, meat   -# of oz of sugar free fluids from all sources daily? 100 oz   -Nausea? No  -Vomiting? No  -Difficulty swallow/food sticking? No  -Heartburn/regurgitation? No  -Character of bowel movements (diarrhea/constipation/bloody stools?) regular  -Which multivitamin product are you taking? Bariatric Pal   -What dose and how frequently are you taking calcium citrate? TID  - from any iron-containing multivitamin by 2 hours? No  -Ulcer risk exposures:   NSAID No  Tobacco No  Alcohol Yes 2x a month  Steroids No  -Minutes of physical activity and what type? Cardio 30 min 5-6 days a week

## 2024-10-25 NOTE — PROGRESS NOTES
[facility] Progress Note:   F/up Provider Visit    Chief Complaint   Patient presents with    Weight Management     Medication follow up, [facility] 5/8/19     Vanesa Carreon is a 39 y.o. female who is here for her f/up medical provider visit for the Medication program. Hx of gastric bypass. Diethylpropion 75 mg tab daily.     See nurse note for additional subjective information.     Last Weight Metrics:      10/25/2024     8:34 AM 6/25/2024     9:25 AM 5/9/2024     9:02 AM 3/28/2024     8:45 AM 1/22/2024     9:29 AM 11/14/2023    10:17 AM 10/17/2023    11:02 AM   Weight Loss Metrics   Pre-op weight (manual entry) 295 lbs 295 lbs 295 lbs 295 lbs 295 lbs 295 lbs 295 lbs   Height 5' 7\" 5' 7\" 5' 7\" 5' 7\" 5' 7\" 5' 7\" 5' 7\"   Weight - Scale 175 lbs 174 lbs 176 lbs 179 lbs 181 lbs 172 lbs 13 oz 179 lbs 6 oz   BMI (Calculated) 27.5 kg/m2 27.3 kg/m2 27.6 kg/m2 28.1 kg/m2 28.4 kg/m2 27.1 kg/m2 28.2 kg/m2   Ideal body weight (manual entry) 140 lbs 140 lbs 140 lbs 140 lbs 140 lbs 140 lbs 140 lbs   EBW in lbs. 155 155 155 155 155 155 155   Goal weight 160 lbs   150 lbs      Weight loss to date in lbs. 120 121 119 116 114 122 115   Percent weight loss (%) 40.68 % 41.02 % 40.34 % 39.32 % 38.64 % 41.42 % 39.19 %   Percent EBW loss (%) 77.4 % 78.1 % 76.8 % 74.8 % 73.5 % 78.7 % 74.2 %     Arm: 11 in  Waist: 29.5 in  Thigh: 20.5 in    Down 0 lbs and 0.5 inches since last visit.     Goal wt: 160-170 lbs    Ideal body weight: 61.6 kg (135 lb 12.9 oz)  Adjusted ideal body weight: 68.7 kg (151 lb 7.7 oz)  Body mass index is 27.41 kg/m².    BP Readings from Last 3 Encounters:   06/25/24 106/69   05/09/24 114/64   03/28/24 122/70      History    Past Medical History:   Diagnosis Date    Asthma     Hyperprolactinemia (HCC)     Vitamin D deficiency      Past Surgical History:   Procedure Laterality Date    GI  05/08/2019    gastric bypass     Current Outpatient Medications   Medication Sig Dispense Refill    Diethylpropion

## 2024-12-13 ENCOUNTER — OFFICE VISIT (OUTPATIENT)
Age: 39
End: 2024-12-13
Payer: COMMERCIAL

## 2024-12-13 VITALS
SYSTOLIC BLOOD PRESSURE: 117 MMHG | BODY MASS INDEX: 27.62 KG/M2 | HEIGHT: 67 IN | RESPIRATION RATE: 18 BRPM | HEART RATE: 67 BPM | TEMPERATURE: 97.6 F | OXYGEN SATURATION: 98 % | WEIGHT: 176 LBS | DIASTOLIC BLOOD PRESSURE: 77 MMHG

## 2024-12-13 DIAGNOSIS — Z79.899 FOLLOW-UP ENCOUNTER INVOLVING MEDICATION: ICD-10-CM

## 2024-12-13 DIAGNOSIS — E66.3 OVERWEIGHT (BMI 25.0-29.9): Primary | ICD-10-CM

## 2024-12-13 DIAGNOSIS — Z98.84 S/P GASTRIC BYPASS: ICD-10-CM

## 2024-12-13 DIAGNOSIS — Z86.39 HX OF OBESITY: ICD-10-CM

## 2024-12-13 DIAGNOSIS — Z71.3 ENCOUNTER FOR WEIGHT LOSS COUNSELING: ICD-10-CM

## 2024-12-13 PROCEDURE — 99214 OFFICE O/P EST MOD 30 MIN: CPT | Performed by: REGISTERED NURSE

## 2024-12-13 RX ORDER — PHENTERMINE HYDROCHLORIDE 37.5 MG/1
37.5 TABLET ORAL
Qty: 30 TABLET | Refills: 1 | Status: SHIPPED | OUTPATIENT
Start: 2024-12-13 | End: 2025-02-11

## 2024-12-13 NOTE — PROGRESS NOTES
Bariatric Postoperative Nurse Note    Vanesarupal Carreon is a 39 y.o. female status post laparoscopic gastric bypass surgery performed on 5/8/2019.      All Post-Ops (including two weeks)  -# of grams of protein daily? 60 g  -sources of protein? Protein shakes, Fish, Chicken, Turkey  -# of oz of sugar free fluids from all sources daily? 100 oz   -Nausea? No  -Vomiting? No  -Difficulty swallow/food sticking? No  -Heartburn/regurgitation? No  -Character of bowel movements (diarrhea/constipation/bloody stools?) regular  -Which multivitamin product are you taking? Bariatric Pal   -What dose and how frequently are you taking calcium citrate? TID  - from any iron-containing multivitamin by 2 hours? No  -Ulcer risk exposures:   NSAID No  Tobacco No  Alcohol Yes 2x a month  Steroids No  -Minutes of physical activity and what type? Cardio 30 min 5-6 days a week  
for self-injury, sleep disturbance and suicidal ideas.    Objective  Vitals:    12/13/24 0944   BP: 117/77   Site: Left Upper Arm   Position: Sitting   Pulse: 67   Resp: 18   Temp: 97.6 °F (36.4 °C)   TempSrc: Temporal   SpO2: 98%   Weight: 79.8 kg (176 lb)   Height: 1.702 m (5' 7\")      No LMP recorded. Patient has had an implant.    Physical Exam  Vitals and nursing note reviewed.   Constitutional:       General: Not in acute distress.  HENT:      Head: Normocephalic and atraumatic.   Eyes:      Pupils: Pupils are equal, round, and reactive to light.   Cardiovascular:      Rate and Rhythm: Normal rate and regular rhythm.   Pulmonary:      Effort: Pulmonary effort is normal.      Breath sounds: Normal breath sounds.   Musculoskeletal:         General: Normal range of motion.   Neurological:      Mental Status: Is alert and oriented to person, place, and time.   Psychiatric:         Mood and Affect: Mood normal.         Behavior: Behavior normal.         Thought Content: Thought content normal.     No results found for this or any previous visit (from the past 672 hour(s)).    Assessment / Plan  Encounter Diagnoses   Name Primary?    Encounter for weight loss counseling     Follow-up encounter involving medication     Hx of obesity     Overweight (BMI 25.0-29.9) Yes    S/P gastric bypass      1.  Weight management      Today, the patient is  Height: 170.2 cm (5' 7\") tall, Weight - Scale: 79.8 kg (176 lb) lbs for a Body mass index is 27.57 kg/m².      Evaluation of progress: Continue diet and exercise efforts.      Goal(s) for next appointment:   -5 lbs  Pt wishes to start phentermine--reviewed potential SE including: elevated HR, BP, increased anxiety, insomnia, thirst, and constipation. Pt to start with 37.5mg tablet--1/2 tab early am--add 2nd half after 1st week if experiences increased hunger, but no later than noon to avoid insomnia. Order 30 tabs, 1 RF--pt understands will need monthly visits    2.  Labs

## 2024-12-27 ENCOUNTER — TELEPHONE (OUTPATIENT)
Age: 39
End: 2024-12-27

## 2024-12-27 NOTE — TELEPHONE ENCOUNTER
Patient called and said she was taking a half tab of Phentermine for 4 days and since day 1, she reports having a headache, stomach aches, and unmanageable bowel movements every time she eats and drinks. Patient states she stopped taking Phentermine and symptoms discontinued. I advised the patient to hold off on taking the medication and will reach out to Alicia ROWLAND to see what the next step is. Patient understood.

## 2024-12-31 ENCOUNTER — PATIENT MESSAGE (OUTPATIENT)
Age: 39
End: 2024-12-31

## 2024-12-31 DIAGNOSIS — Z86.39 HX OF OBESITY: ICD-10-CM

## 2024-12-31 DIAGNOSIS — E66.3 OVERWEIGHT (BMI 25.0-29.9): Primary | ICD-10-CM

## 2024-12-31 DIAGNOSIS — Z79.899 FOLLOW-UP ENCOUNTER INVOLVING MEDICATION: ICD-10-CM

## 2024-12-31 DIAGNOSIS — Z71.3 ENCOUNTER FOR WEIGHT LOSS COUNSELING: ICD-10-CM

## 2025-01-06 RX ORDER — DIETHYLPROPION HYDROCHLORIDE 75 MG/1
75 TABLET, EXTENDED RELEASE ORAL DAILY
Qty: 30 TABLET | Refills: 1 | Status: SHIPPED | OUTPATIENT
Start: 2025-01-06 | End: 2025-02-05

## 2025-01-06 NOTE — TELEPHONE ENCOUNTER
Pt agreeable to restart diethylpropion--reviewed potential SE, risks, cost, and benefits.  Order 75 mg tab ER, #30, R1--pt understands will need monthly visits

## 2025-04-28 ENCOUNTER — TELEPHONE (OUTPATIENT)
Age: 40
End: 2025-04-28

## 2025-04-28 DIAGNOSIS — K91.2 POSTSURGICAL MALABSORPTION: ICD-10-CM

## 2025-04-28 DIAGNOSIS — K91.2 POSTSURGICAL MALABSORPTION: Primary | ICD-10-CM

## 2025-05-07 ENCOUNTER — HOSPITAL ENCOUNTER (OUTPATIENT)
Age: 40
Discharge: HOME | End: 2025-05-10

## 2025-05-08 LAB
A/G RATIO: 1.4 RATIO (ref 1.1–2.6)
ALBUMIN: 4.2 G/DL (ref 3.5–5)
ALP BLD-CCNC: 62 U/L (ref 25–115)
ALT SERPL-CCNC: 12 U/L (ref 5–40)
ANION GAP SERPL CALCULATED.3IONS-SCNC: 15 MMOL/L (ref 3–15)
AST SERPL-CCNC: 16 U/L (ref 10–37)
BASOPHILS # BLD: 1 % (ref 0–2)
BASOPHILS ABSOLUTE: 0 K/UL (ref 0–0.2)
BILIRUB SERPL-MCNC: 0.3 MG/DL (ref 0.2–1.2)
BUN BLDV-MCNC: 15 MG/DL (ref 6–22)
CALCIUM SERPL-MCNC: 9.3 MG/DL (ref 8.4–10.5)
CHLORIDE BLD-SCNC: 103 MMOL/L (ref 98–110)
CHOLESTEROL, TOTAL: 175 MG/DL (ref 110–200)
CHOLESTEROL/HDL RATIO: 1.8 (ref 0–5)
CO2: 21 MMOL/L (ref 20–32)
CREAT SERPL-MCNC: 0.9 MG/DL (ref 0.5–1.2)
EOSINOPHIL # BLD: 1 % (ref 0–6)
EOSINOPHILS ABSOLUTE: 0 K/UL (ref 0–0.5)
FERRITIN: 11 NG/ML (ref 10–291)
FOLATE: 7.5 NG/ML
GFR, ESTIMATED: 78.4 ML/MIN/1.73 SQ.M.
GLOBULIN: 3.1 G/DL (ref 2–4)
GLUCOSE: 72 MG/DL (ref 70–99)
HCT VFR BLD CALC: 33.8 % (ref 35.1–46.5)
HDLC SERPL-MCNC: 96 MG/DL
HEMOGLOBIN: 10.4 G/DL (ref 11.7–15.5)
IRON: 34 MCG/DL (ref 30–160)
LDL CHOLESTEROL: 70 MG/DL (ref 50–99)
LDL/HDL RATIO: 0.7
LYMPHOCYTES # BLD: 37 % (ref 20–45)
LYMPHOCYTES ABSOLUTE: 1.2 K/UL (ref 1–4.8)
MCH RBC QN AUTO: 30 PG (ref 26–34)
MCHC RBC AUTO-ENTMCNC: 31 G/DL (ref 31–36)
MCV RBC AUTO: 96 FL (ref 80–99)
MONOCYTES ABSOLUTE: 0.4 K/UL (ref 0.1–1)
MONOCYTES: 13 % (ref 3–12)
NEUTROPHILS ABSOLUTE: 1.6 K/UL (ref 1.8–7.7)
NEUTROPHILS SEGMENTED: 48 % (ref 40–75)
NON-HDL CHOLESTEROL: 79 MG/DL
PDW BLD-RTO: 17.7 % (ref 10–15.5)
PLATELET # BLD: 233 K/UL (ref 140–440)
PMV BLD AUTO: 10.7 FL (ref 9–13)
POTASSIUM SERPL-SCNC: 4 MMOL/L (ref 3.5–5.5)
RBC # BLD: 3.51 M/UL (ref 3.8–5.2)
SODIUM BLD-SCNC: 139 MMOL/L (ref 133–145)
TOTAL PROTEIN: 7.3 G/DL (ref 6.4–8.3)
TRIGL SERPL-MCNC: 41 MG/DL (ref 40–149)
VITAMIN B-12: 319 PG/ML (ref 211–911)
VITAMIN D 25-HYDROXY: 25.5 NG/ML (ref 32–100)
VLDLC SERPL CALC-MCNC: 8 MG/DL (ref 8–30)
WBC # BLD: 3.2 K/UL (ref 4–11)

## 2025-05-09 ENCOUNTER — OFFICE VISIT (OUTPATIENT)
Age: 40
End: 2025-05-09

## 2025-05-09 VITALS
WEIGHT: 178 LBS | BODY MASS INDEX: 27.94 KG/M2 | TEMPERATURE: 97.5 F | DIASTOLIC BLOOD PRESSURE: 63 MMHG | HEART RATE: 61 BPM | HEIGHT: 67 IN | OXYGEN SATURATION: 100 % | SYSTOLIC BLOOD PRESSURE: 99 MMHG

## 2025-05-09 DIAGNOSIS — K91.2 POSTSURGICAL MALABSORPTION: ICD-10-CM

## 2025-05-09 DIAGNOSIS — Z86.39 HX OF OBESITY: ICD-10-CM

## 2025-05-09 DIAGNOSIS — Z98.84 S/P GASTRIC BYPASS: ICD-10-CM

## 2025-05-09 DIAGNOSIS — Z71.3 ENCOUNTER FOR WEIGHT LOSS COUNSELING: ICD-10-CM

## 2025-05-09 DIAGNOSIS — Z79.899 FOLLOW-UP ENCOUNTER INVOLVING MEDICATION: ICD-10-CM

## 2025-05-09 DIAGNOSIS — E66.3 OVERWEIGHT (BMI 25.0-29.9): ICD-10-CM

## 2025-05-09 RX ORDER — DIETHYLPROPION HYDROCHLORIDE 75 MG/1
75 TABLET, EXTENDED RELEASE ORAL DAILY
Qty: 30 TABLET | Refills: 2 | Status: SHIPPED | OUTPATIENT
Start: 2025-05-09 | End: 2025-06-08

## 2025-05-09 RX ORDER — CYANOCOBALAMIN 1000 UG/ML
1000 INJECTION, SOLUTION INTRAMUSCULAR; SUBCUTANEOUS ONCE
Status: COMPLETED | OUTPATIENT
Start: 2025-05-09 | End: 2025-05-09

## 2025-05-09 RX ADMIN — CYANOCOBALAMIN 1000 MCG: 1000 INJECTION, SOLUTION INTRAMUSCULAR; SUBCUTANEOUS at 13:33

## 2025-05-09 NOTE — PROGRESS NOTES
Bariatric Postoperative Nurse Note    Vanesarupal Carreon is a 39 y.o. female status post laparoscopic gastric bypass surgery performed on 05/07/2019.    All Post-Ops (including two weeks)  -# of grams of protein daily? 60 grams  -sources of protein? 45 mg protein powder, eat chicken, steak, seafood at times  -# of oz of sugar free fluids from all sources daily? Over 64 oz, coffee in the morning  -Nausea? No  -Vomiting? No  -Difficulty swallow/food sticking? No  -Heartburn/regurgitation? No  -Character of bowel movements (diarrhea/constipation/bloody stools?) regular  -Which multivitamin product are you taking? Bariatric Pal   -What dose and how frequently are you taking calcium citrate? 3 x daily  - from any iron-containing multivitamin by 2 hours? No  -Ulcer risk exposures:   NSAID No  Tobacco No  Alcohol Yes. Occasionally  Steroids No  -Minutes of physical activity and what type? 30 min 3-4 x weekly lifting weights and high interval training  
[facility] Nurse Note:     CC: Weight Management    Vanesa Carreon is a 39 y.o. female who is here for her f/up medical provider visit for the Medication program.     Did you have any problems adhering to the program since last visit? No  If yes, please explain:     Since your last visit, have you experienced any complications? No    Have you received any other medical care since last visit? No  If yes, where and for what?     Have you had any change in your medications since your last visit? No If yes what?     Are you taking an anti-obesity medication? Yes If yes what and are you experiencing any side effects? Diethylpropion 75 mg, denies SE    Would you still like to continue your current medication and dosage? Yes    BP Readings from Last 3 Encounters:   12/13/24 117/77   06/25/24 106/69   05/09/24 114/64      Eating Habits Over Last Week:    How many oz of sugar-free fluids are you consuming? Over 64 oz daily    Did you consume your prescribed meal replacement regimen each day? No    Physical Activity Routine:    Aerobic exercise: 30 min HIIT    Resistance exercise: 30 min weight lifting 4 x weekly   
Patient given vit B 12, 1 ML.  Patient tolerated procedure well.  
Negative for dizziness, light-headedness and headaches.   Psychiatric/Behavioral: Negative for self-injury, sleep disturbance and suicidal ideas.    Objective  Vitals:    05/09/25 1253   BP: 99/63   BP Site: Left Upper Arm   Patient Position: Sitting   BP Cuff Size: Large Adult   Pulse: 61   Temp: 97.5 °F (36.4 °C)   TempSrc: Infrared   SpO2: 100%   Weight: 80.7 kg (178 lb)   Height: 1.702 m (5' 7\")      No LMP recorded. Patient has had an implant.    Physical Exam  Vitals and nursing note reviewed.   Constitutional:       General: Not in acute distress.  HENT:      Head: Normocephalic and atraumatic.   Eyes:      Pupils: Pupils are equal, round, and reactive to light.   Cardiovascular:      Rate and Rhythm: Normal rate and regular rhythm.   Pulmonary:      Effort: Pulmonary effort is normal.      Breath sounds: Normal breath sounds.   Musculoskeletal:         General: Normal range of motion.   Neurological:      Mental Status: Is alert and oriented to person, place, and time.   Psychiatric:         Mood and Affect: Mood normal.         Behavior: Behavior normal.         Thought Content: Thought content normal.     Recent Results (from the past 4 weeks)   Ferritin    Collection Time: 05/07/25 12:00 AM   Result Value Ref Range    Ferritin 11 10 - 291 ng/mL   Iron    Collection Time: 05/07/25 12:00 AM   Result Value Ref Range    Iron 34 30 - 160 mcg/dL   Vitamin D 25 Hydroxy    Collection Time: 05/07/25 12:00 AM   Result Value Ref Range    Vit D, 25-Hydroxy 25.5 (L) 32.0 - 100.0 ng/mL   Comprehensive Metabolic Panel    Collection Time: 05/07/25 12:00 AM   Result Value Ref Range    Potassium 4.0 3.5 - 5.5 mmol/L    Sodium 139 133 - 145 mmol/L    Chloride 103 98 - 110 mmol/L    Glucose 72 70 - 99 mg/dL    Calcium 9.3 8.4 - 10.5 mg/dL    Albumin 4.2 3.5 - 5.0 g/dL    ALT 12 5 - 40 U/L    AST 16 10 - 37 U/L    Total Bilirubin 0.3 0.2 - 1.2 mg/dL    Alkaline Phosphatase 62 25 - 115 U/L    BUN 15 6 - 22 mg/dL    CO2 21 20 -

## 2025-05-12 LAB — THIAMINE BLOOD: 69 NMOL/L (ref 78–185)

## 2025-05-23 ENCOUNTER — RESULTS FOLLOW-UP (OUTPATIENT)
Age: 40
End: 2025-05-23

## 2025-05-23 NOTE — TELEPHONE ENCOUNTER
Spoke with patient made aware of lab results, patient will call when she gets to work with a date she is available

## 2025-05-23 NOTE — TELEPHONE ENCOUNTER
----- Message from BRIANNA Lindsey NP sent at 5/22/2025  8:41 PM EDT -----  Please call pt to schedule a nurse visit for B1 inj. Noted it was low. Thank you  ----- Message -----  From: Tj Tanner Incoming [facility] Lab Results [facility]  Sent: 5/8/2025   9:52 AM EDT  To: BRIANNA Willson NP

## 2025-05-27 ENCOUNTER — TELEPHONE (OUTPATIENT)
Age: 40
End: 2025-05-27

## 2025-05-28 DIAGNOSIS — Z79.899 FOLLOW-UP ENCOUNTER INVOLVING MEDICATION: ICD-10-CM

## 2025-05-28 DIAGNOSIS — Z71.3 ENCOUNTER FOR WEIGHT LOSS COUNSELING: ICD-10-CM

## 2025-05-28 DIAGNOSIS — Z86.39 HX OF OBESITY: ICD-10-CM

## 2025-05-28 DIAGNOSIS — E66.3 OVERWEIGHT (BMI 25.0-29.9): ICD-10-CM

## 2025-05-29 RX ORDER — DIETHYLPROPION HYDROCHLORIDE 75 MG/1
75 TABLET, EXTENDED RELEASE ORAL DAILY
Qty: 30 TABLET | Refills: 2 | Status: SHIPPED | OUTPATIENT
Start: 2025-05-29 | End: 2025-06-28

## 2025-06-04 ENCOUNTER — CLINICAL SUPPORT (OUTPATIENT)
Age: 40
End: 2025-06-04

## 2025-06-04 DIAGNOSIS — E51.9 THIAMINE DEFICIENCY: Primary | ICD-10-CM

## 2025-06-04 RX ORDER — THIAMINE HYDROCHLORIDE 100 MG/ML
200 INJECTION, SOLUTION INTRAMUSCULAR; INTRAVENOUS ONCE
Status: COMPLETED | OUTPATIENT
Start: 2025-06-04 | End: 2025-06-04

## 2025-06-04 RX ADMIN — THIAMINE HYDROCHLORIDE 200 MG: 100 INJECTION, SOLUTION INTRAMUSCULAR; INTRAVENOUS at 15:13

## 2025-06-04 NOTE — PROGRESS NOTES
After obtaining consent, and per orders of KASHIF Vargas, injection of Vitamin B-1 given in Right deltoid and Left deltoid by Rosita West MA. Patient instructed to remain in clinic for 20 minutes afterwards, and to report any adverse reaction to me immediately.

## 2025-08-12 RX ORDER — ERGOCALCIFEROL 1.25 MG/1
CAPSULE, LIQUID FILLED ORAL
Qty: 4 CAPSULE | Refills: 2 | Status: SHIPPED | OUTPATIENT
Start: 2025-08-12

## (undated) DEVICE — PACK PROCEDURE SURG LAPAROSCOPY 17X7 MM BRTRC PRIMUS

## (undated) DEVICE — LIGHT HANDLE: Brand: DEVON

## (undated) DEVICE — SUTURE VCRL SZ 3-0 L27IN ABSRB VLT L26MM SH 1/2 CIR J316H

## (undated) DEVICE — SUT SLK 2-0SH 30IN BLK --

## (undated) DEVICE — SHEAR HARMONIC ACET 5MMX36CM -- ACE PLUS

## (undated) DEVICE — UNIVERSAL FIXATION CANNULA: Brand: VERSAONE

## (undated) DEVICE — RELOAD STPL L60MM H1-2.6MM MESENTERY THN TISS WHT 6 ROW

## (undated) DEVICE — KENDALL SCD EXPRESS SLEEVES, KNEE LENGTH, MEDIUM: Brand: KENDALL SCD

## (undated) DEVICE — TROCAR ENDOSCP SHFT L100MM DIA12MM INTEGR STBL ENDOPATH

## (undated) DEVICE — STAPLE INT WHT BLU G GRN BLK REINF FOR ENDOPATH ECHELON FLX

## (undated) DEVICE — SOL IRR STRL H2O 500ML STRL --

## (undated) DEVICE — RELOAD STPL L60MM H1.5-3.6MM REG TISS BLU GRIPPING SURF B

## (undated) DEVICE — PREP SKN CHLORHEX GLUC 8OZ --

## (undated) DEVICE — TRUE CONTENT TO BE POPULATED AS PART OF REBRANDING: Brand: ARGYLE

## (undated) DEVICE — REM POLYHESIVE ADULT PATIENT RETURN ELECTRODE: Brand: VALLEYLAB

## (undated) DEVICE — INTENDED FOR TISSUE SEPARATION, AND OTHER PROCEDURES THAT REQUIRE A SHARP SURGICAL BLADE TO PUNCTURE OR CUT.: Brand: BARD-PARKER ® CARBON RIB-BACK BLADES

## (undated) DEVICE — BLADELESS OPTICAL TROCAR WITH FIXATION CANNULA: Brand: VERSAPORT

## (undated) DEVICE — STAPLER SKIN L440MM 32MM LNG 12 FIRING B FRM PWR + GRIPPING

## (undated) DEVICE — APPLICATOR BNDG 1MM ADH PREMIERPRO EXOFIN

## (undated) DEVICE — KIT CATH OD16FR 5ML BLLN SIL URIN INDWL STR TIP INF CTRL

## (undated) DEVICE — VISUALIZATION SYSTEM: Brand: CLEARIFY

## (undated) DEVICE — DISPOSABLE SUCTION/IRRIGATOR TUBE SET WITH TIP: Brand: AHTO

## (undated) DEVICE — GLOVE SURG SZ 7.5 L11.73IN FNGR THK7.5MIL STRW LTX POLYMER

## (undated) DEVICE — SHEARS: Brand: ENDO SHEARS

## (undated) DEVICE — TROCAR ENDOSCP L100MM DIA12MM STBL SL BLDELSS ENDOPATH XCEL

## (undated) DEVICE — SUTURE MCRYL SZ 4-0 L27IN ABSRB UD L24MM PS-1 3/8 CIR PRIM Y935H

## (undated) DEVICE — SUTURE VCRL SZ 2-0 L54IN ABSRB VLT W/O NDL POLYGLACTIN 910 J618H